# Patient Record
Sex: MALE | Race: WHITE | NOT HISPANIC OR LATINO | Employment: FULL TIME | URBAN - METROPOLITAN AREA
[De-identification: names, ages, dates, MRNs, and addresses within clinical notes are randomized per-mention and may not be internally consistent; named-entity substitution may affect disease eponyms.]

---

## 2017-04-20 ENCOUNTER — OFFICE VISIT (OUTPATIENT)
Dept: NEUROLOGY | Facility: MEDICAL CENTER | Age: 27
End: 2017-04-20
Payer: COMMERCIAL

## 2017-04-20 VITALS
SYSTOLIC BLOOD PRESSURE: 138 MMHG | DIASTOLIC BLOOD PRESSURE: 76 MMHG | TEMPERATURE: 98.1 F | BODY MASS INDEX: 27.36 KG/M2 | HEART RATE: 66 BPM | OXYGEN SATURATION: 100 % | HEIGHT: 72 IN | WEIGHT: 202 LBS

## 2017-04-20 DIAGNOSIS — R56.9 SEIZURE (HCC): ICD-10-CM

## 2017-04-20 PROCEDURE — 99204 OFFICE O/P NEW MOD 45 MIN: CPT | Performed by: PSYCHIATRY & NEUROLOGY

## 2017-04-20 NOTE — PROGRESS NOTES
NEUROLOGY INITIAL NOTE    Referring Physician  Marlena Mancia  Chief Complaint:  seizure    History of Present Illness:   For how long have you had seizures?  5 days ago    Frequency of seizures  One only    When was the last seizure? Patient was told that he/she cannot drive [place] the laws of the [place] for at 3 months after the last seizure.  15 APR 2017    Why are you having seizures?  No reason    DESCRIPTION  Behavior before the seizure  None    When do the seizures happen (time of the day, day of the month-menstrual period)?  Daytime    Any possible triggers (alcohol, flashing lights, etc)  He cannot think of any    What happens during the event? Eyes open or closed, sphincter incontinence, stiffness vs limpness, automatisms, etc,  LOC, he became stiff and had a convulsion.    Duration of the spell  Unknown    Part of the body involved  Whole body    What happens after the event?  Was back conversing 10-15 minutes after    What medications have you tried before?  None    What medications are you on now?  None    What side-effects have you had w the current anti-seizure regime?  N/a    Screening for depression was performed: not depressed      Medical History:  none    Family History:  HTN  Cancer  stroke    Social History:  Tobacco use: none. Alcohol: drinks on weekends. Work: engineering in buildings. Lives w a roommate    ROS:  Constitutional: Denies fevers, Denies weight changes   Eyes: Denies changes in vision, no eye pain   Ears/Nose/Throat/Mouth: Denies nasal congestion or sore throat   Cardiovascular: Denies chest pain or palpitations   Respiratory: Denies SOB.   Gastrointestinal/Hepatic: Denies abdominal pain, nausea, vomiting, diarrhea, constipation or GI bleeding   Genitourinary: Denies bladder dysfunction, dysuria or frequency   Musculoskeletal/Rheum: Denies joint pain and swelling   Skin/Breast: Denies rash, denies breast lumps or discharge   Neurological: Denies headache, confusion, memory  loss or focal weakness/parasthesias   Psychiatric: denies mood disorder.   Endocrine: denies hx of diabetes or thyroid dysfunction   Heme/Oncology/Lymph Nodes: Denies enlarged lymph nodes, denies brusing or known bleeding disorder   Allergic/Immunologic: Denies hx of allergies   Screening for depression was performed  All other systems were reviewed and are negative (AMA/CMS criteria)       Neurological Exam:  Orientation to time, place, and person were normal  Recent and remote memory were normal  Attention span and concentration were normal  Language (naming, repetition, spontaneous speech) was normal  Fund of knowledge was normal    All cranial nerves were normal: Pupils were equally round and reactive to light    Motor: (tone, bulk and strength): normal    Sensation (all modalities) was normal    Reflexes were normal and symmetrical in both upper and lower extremities    Coordination (finger-to-nose, heel/knee/shin, rapid alternating movements) was normal. There was no ataxia, no tremors, and no dysmetria. Station and gait were normal    Peripheral pulses were normal       NEUROIMAGING: CT of the head done on 15APR2017 was normal    EEG:     Medications:  No current outpatient prescriptions on file.     No current facility-administered medications for this visit.       Blood pressure 138/76, pulse 66, temperature 36.7 °C (98.1 °F), height 1.829 m (6'), weight 91.627 kg (202 lb), SpO2 100 %.            @CMP@    There are no active problems to display for this patient.        Diagnosis:   One single seizure    Plan/Recommendations:  Patient was told that the State [facility] prohibits people from driving up to 90 days after they have had an epileptic seizure.  He should be able to go back to driving in three months provided that he reamains seizure  Will get an EEG      Depression screening was  Done_____  Not Done_____        J. Michoacano Espinal MD  Professor of Neurology and Pediatrics  [facility]  School of Medicine  [facility]

## 2017-06-06 ENCOUNTER — NON-PROVIDER VISIT (OUTPATIENT)
Dept: NEUROLOGY | Facility: MEDICAL CENTER | Age: 27
End: 2017-06-06
Payer: COMMERCIAL

## 2017-06-06 DIAGNOSIS — R56.9 SEIZURE (HCC): ICD-10-CM

## 2017-06-06 PROCEDURE — 95819 EEG AWAKE AND ASLEEP: CPT | Performed by: PSYCHIATRY & NEUROLOGY

## 2017-06-06 NOTE — MR AVS SNAPSHOT
Tavares Knowles   2017 9:00 AM   Non-Provider Visit   MRN: [id number]    Department:  [facility]   Dept Phone:  [phone]    Description:  Male : 1990   Provider:  NEURODIAGNOSTIC LAB [facility]           Allergies as of 2017     No Known Allergies      You were diagnosed with     Seizure (CMS-[facility])   [[id number]]         Vital Signs     Smoking Status                   Never Smoker            Basic Information     Date Of Birth Sex Race Ethnicity Preferred Language    1990 Male White Non- English      Your appointments     2017  2:20 PM   Follow Up Visit with ESTEBAN Barrios   [facility] (--)    [address], [address]  [facility] [address] [phone]           You will be receiving a confirmation call a few days before your appointment from our automated call confirmation system.              Health Maintenance        Date Due Completion Dates    IMM HEP B VACCINE (1 of 3 - Primary Series) 1992 ---    IMM HEP A VACCINE (1 of 2 - Standard Series) 1993 ---    IMM HPV VACCINE (1 of 3 - Male 3 Dose Series) 2003 ---    IMM VARICELLA (CHICKENPOX) VACCINE (1 of 2 - 2 Dose Adolescent Series) 2005 ---    IMM DTaP/Tdap/Td Vaccine (1 - Tdap) 2011 ---            Current Immunizations     No immunizations on file.      Below and/or attached are the medications your provider expects you to take. Review all of your home medications and newly ordered medications with your provider and/or pharmacist. Follow medication instructions as directed by your provider and/or pharmacist. Please keep your medication list with you and share with your provider. Update the information when medications are discontinued, doses are changed, or new medications (including over-the-counter products) are added; and carry medication information at all times in the event of emergency situations     Allergies:  No Known Allergies          Medications  Valid as  of: June 06, 2017 -  9:46 AM    Generic Name Brand Name Tablet Size Instructions for use    .                 Medicines prescribed today were sent to:     [facility] [address] - [place], [place] - [address]    [address] [place] [address]    Phone: [phone] Fax: [phone]    Open 24 Hours?: No      Medication refill instructions:       If your prescription bottle indicates you have medication refills left, it is not necessary to call your provider’s office. Please contact your pharmacy and they will refill your medication.    If your prescription bottle indicates you do not have any refills left, you may request refills at any time through one of the following ways: The online [employer] system (except Urgent Care), by calling your provider’s office, or by asking your pharmacy to contact your provider’s office with a refill request. Medication refills are processed only during regular business hours and may not be available until the next business day. Your provider may request additional information or to have a follow-up visit with you prior to refilling your medication.   *Please Note: Medication refills are assigned a new Rx number when refilled electronically. Your pharmacy may indicate that no refills were authorized even though a new prescription for the same medication is available at the pharmacy. Please request the medicine by name with the pharmacy before contacting your provider for a refill.           CartMomo Access Code: [email or web address]  Expires: 7/6/2017  8:00 AM    CartMomo  A secure, online tool to manage your health information     [employer]’s CartMomo® is a secure, online tool that connects you to your personalized health information from the privacy of your home -- day or night - making it very easy for you to manage your healthcare. Once the activation process is completed, you can even access your medical information using the CartMomo janey, which is available for free in the Apple Janey  store or Google Play store.     [employer] provides the following levels of access (as shown below):   My Chart Features   [facility] Doctor [facility]  Specialists [facility]  Urgent  Care Non-[facility]  Primary Care  Doctor   Email your healthcare team securely and privately 24/7 X X X    Manage appointments: schedule your next appointment; view details of past/upcoming appointments X      Request prescription refills. X      View recent personal medical records, including lab and immunizations X X X X   View health record, including health history, allergies, medications X X X X   Read reports about your outpatient visits, procedures, consult and ER notes X X X X   See your discharge summary, which is a recap of your hospital and/or ER visit that includes your diagnosis, lab results, and care plan. X X       How to register for [employer]:  1. Go to  [email or web address].  2. Click on the Sign Up Now box, which takes you to the New Member Sign Up page. You will need to provide the following information:  a. Enter your [employer] Access Code exactly as it appears at the top of this page. (You will not need to use this code after you’ve completed the sign-up process. If you do not sign up before the expiration date, you must request a new code.)   b. Enter your date of birth.   c. Enter your home email address.   d. Click Submit, and follow the next screen’s instructions.  3. Create a [employer] ID. This will be your [employer] login ID and cannot be changed, so think of one that is secure and easy to remember.  4. Create a [employer] password. You can change your password at any time.  5. Enter your Password Reset Question and Answer. This can be used at a later time if you forget your password.   6. Enter your e-mail address. This allows you to receive e-mail notifications when new information is available in [employer].  7. Click Sign Up. You can now view your health information.    For assistance activating your "[employer]t  account, call [phone]

## 2017-06-10 NOTE — PROCEDURES
DATE OF SERVICE:  06/06/2017    ROUTINE ELECTROENCEPHALOGRAM REPORT        INDICATION:       Patient has single seizure in April 2017, loss of consciousness and convulsion   with history of head injury.    TECHNIQUE: 30 channel routine electroencephalogram (EEG) was performed in accordance with the international 10-20 system. The study was reviewed in bipolar and referential montages. The recording examined the patient during wakeful and drowsy state(s).     DESCRIPTION OF THE RECORD:    The EEG background consists of posterior dominant alpha rhythm 10-11 Hz.    There is one episode of small sharp waves over the frontal region followed by   delta burst and stage II sleep was obtained and hyperventilation induced built up,   delta burst, which is still within normal range. There are no focal delta slow    ACTIVATION PROCEDURES:      Hyperventilation and Photic Stimulation were done    ICTAL AND/OR INTERICTAL FINDINGS:    one episode of small sharp waves over the frontal region followed by delta burst  No regional slowing was seen during this routine study.  No clinical events or seizures were reported or recorded during the study.      EKG: sampling of the EKG recording demonstrated sinus rhythm.        INTERPRETATION:    ________________________________________________________________________      INTERPRETATION:  This EEG denotes of one episode of probable epileptiform sharp waves from the   frontal region with potential of epileptiform disorder.    ________________________________________________________________________         ____________________________________     MD PRO LICONA / LIBERTAD    DD:  06/10/2017 08:05:32  DT:  06/10/2017 08:47:06    D#:  [id number]  Job#:  [id number]

## 2017-07-17 ENCOUNTER — OFFICE VISIT (OUTPATIENT)
Dept: NEUROLOGY | Facility: MEDICAL CENTER | Age: 27
End: 2017-07-17
Payer: COMMERCIAL

## 2017-07-17 VITALS
HEIGHT: 72 IN | HEART RATE: 57 BPM | SYSTOLIC BLOOD PRESSURE: 138 MMHG | TEMPERATURE: 98.1 F | BODY MASS INDEX: 27.43 KG/M2 | RESPIRATION RATE: 16 BRPM | OXYGEN SATURATION: 97 % | DIASTOLIC BLOOD PRESSURE: 82 MMHG | WEIGHT: 202.5 LBS

## 2017-07-17 DIAGNOSIS — R56.9 SEIZURE (HCC): ICD-10-CM

## 2017-07-17 DIAGNOSIS — M54.6 ACUTE MIDLINE THORACIC BACK PAIN: ICD-10-CM

## 2017-07-17 PROCEDURE — 99213 OFFICE O/P EST LOW 20 MIN: CPT | Performed by: NURSE PRACTITIONER

## 2017-07-17 ASSESSMENT — ENCOUNTER SYMPTOMS
SORE THROAT: 0
ABDOMINAL PAIN: 0
DIARRHEA: 0
SEIZURES: 0
DIZZINESS: 0
NAUSEA: 0
MUSCULOSKELETAL NEGATIVE: 1
CONSTITUTIONAL NEGATIVE: 1
COUGH: 0
VOMITING: 0
NERVOUS/ANXIOUS: 0
DEPRESSION: 0
HEADACHES: 0
DOUBLE VISION: 0

## 2017-07-17 ASSESSMENT — PATIENT HEALTH QUESTIONNAIRE - PHQ9: CLINICAL INTERPRETATION OF PHQ2 SCORE: 0

## 2017-07-17 NOTE — MR AVS SNAPSHOT
Tavares Knowles   2017 2:20 PM   Office Visit   MRN: [id number]    Department:  [facility]   Dept Phone:  [phone]    Description:  Male : 1990   Provider:  ESTEBAN Barrios           Reason for Visit     Follow-Up EEG Follow up      Allergies as of 2017     No Known Allergies      You were diagnosed with     Acute midline thoracic back pain   [2158212]       Seizure (CMS-HCC)   [[id number]]         Vital Signs     Blood Pressure Pulse Temperature Respirations Height Weight    138/82 mmHg 57 36.7 °C (98.1 °F) 16 1.829 m (6') 91.853 kg (202 lb 8 oz)    Body Mass Index Oxygen Saturation Smoking Status             27.46 kg/m2 97% Never Smoker          Basic Information     Date Of Birth Sex Race Ethnicity Preferred Language    1990 Male White Non- English      Problem List              ICD-10-CM Priority Class Noted - Resolved    Acute midline thoracic back pain M54.6   2017 - Present      Health Maintenance        Date Due Completion Dates    IMM HEP B VACCINE (1 of 3 - Primary Series) 1992 ---    IMM HEP A VACCINE (1 of 2 - Standard Series) 1993 ---    IMM HPV VACCINE (1 of 3 - Male 3 Dose Series) 2003 ---    IMM VARICELLA (CHICKENPOX) VACCINE (1 of 2 - 2 Dose Adolescent Series) 2005 ---    IMM DTaP/Tdap/Td Vaccine (1 - Tdap) 2011 ---    IMM INFLUENZA (1) 2017 ---            Current Immunizations     No immunizations on file.      Below and/or attached are the medications your provider expects you to take. Review all of your home medications and newly ordered medications with your provider and/or pharmacist. Follow medication instructions as directed by your provider and/or pharmacist. Please keep your medication list with you and share with your provider. Update the information when medications are discontinued, doses are changed, or new medications (including over-the-counter products) are added; and carry medication information at all  times in the event of emergency situations     Allergies:  No Known Allergies          Medications  Valid as of: July 17, 2017 -  2:45 PM    Generic Name Brand Name Tablet Size Instructions for use    .                 Medicines prescribed today were sent to:     [facility] [address] - [place], [place] - [address]    [address] [place] [address]    Phone: [phone] Fax: [phone]    Open 24 Hours?: No      Medication refill instructions:       If your prescription bottle indicates you have medication refills left, it is not necessary to call your provider’s office. Please contact your pharmacy and they will refill your medication.    If your prescription bottle indicates you do not have any refills left, you may request refills at any time through one of the following ways: The online [employer] system (except Urgent Care), by calling your provider’s office, or by asking your pharmacy to contact your provider’s office with a refill request. Medication refills are processed only during regular business hours and may not be available until the next business day. Your provider may request additional information or to have a follow-up visit with you prior to refilling your medication.   *Please Note: Medication refills are assigned a new Rx number when refilled electronically. Your pharmacy may indicate that no refills were authorized even though a new prescription for the same medication is available at the pharmacy. Please request the medicine by name with the pharmacy before contacting your provider for a refill.        Your To Do List     Future Labs/Procedures Complete By Expires    MR-CERVICAL SPINE-WITH & W/O  As directed 7/17/2018      Referral     A referral request has been sent to our patient care coordination department. Please allow 3-5 business days for us to process this request and contact you either by phone or mail. If you do not hear from us by the 5th business day, please call us at [phone].           "[employer] Access Code: [email or web address]  Expires: 8/16/2017  2:45 PM    "[employer]  A secure, online tool to manage your health information     [employer]’s "[employer]® is a secure, online tool that connects you to your personalized health information from the privacy of your home -- day or night - making it very easy for you to manage your healthcare. Once the activation process is completed, you can even access your medical information using the "[employer] janey, which is available for free in the Apple Janey store or Google Play store.     "[employer] provides the following levels of access (as shown below):   My Chart Features   [facility] Primary Care Doctor [facility]  Specialists [facility]  Urgent  Care Non-[facility]  Primary Care  Doctor   Email your healthcare team securely and privately 24/7 X X X    Manage appointments: schedule your next appointment; view details of past/upcoming appointments X      Request prescription refills. X      View recent personal medical records, including lab and immunizations X X X X   View health record, including health history, allergies, medications X X X X   Read reports about your outpatient visits, procedures, consult and ER notes X X X X   See your discharge summary, which is a recap of your hospital and/or ER visit that includes your diagnosis, lab results, and care plan. X X       How to register for "[employer]:  1. Go to  [email or web address].  2. Click on the Sign Up Now box, which takes you to the New Member Sign Up page. You will need to provide the following information:  a. Enter your "[employer] Access Code exactly as it appears at the top of this page. (You will not need to use this code after you’ve completed the sign-up process. If you do not sign up before the expiration date, you must request a new code.)   b. Enter your date of birth.   c. Enter your home email address.   d. Click Submit, and follow the next screen’s instructions.  3. Create a "[employer] ID. This  will be your [employer] login ID and cannot be changed, so think of one that is secure and easy to remember.  4. Create a [employer] password. You can change your password at any time.  5. Enter your Password Reset Question and Answer. This can be used at a later time if you forget your password.   6. Enter your e-mail address. This allows you to receive e-mail notifications when new information is available in [employer].  7. Click Sign Up. You can now view your health information.    For assistance activating your [employer] account, call [phone]

## 2017-07-17 NOTE — PROGRESS NOTES
Subjective:      Tavraes Knowles is a 27 y.o. male who presents with Follow-Up for One time seizure.  Here with girlfriend today.        HPI Here for a three month follow-up.  Wishing to be cleared to drive today.    April 15, 2017 suspected seizure, 1pm.  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  Went to give a high-five to a friend then blacked out.  He did twist off the side of the truck and fell.  Has had continuous mid-thoracic bilateral pain agrevated with arm bar raises, turning, etc.    INTERPRETATION:  This EEG denotes of one episode of probable epileptiform sharp waves from the frontal region with potential of epileptiform disorder.    Neck to the base of his rib cage-- twisting and sneezing as well as large breaths.  The pain/discomfort has stayed consistent.  Was referred to a chiropractor with minimal benefit.    No current outpatient prescriptions on file.     No current facility-administered medications for this visit.       Review of Systems   Constitutional: Negative.    HENT: Negative for hearing loss, nosebleeds and sore throat.         No recent head injury.   Eyes: Negative for double vision.        No new loss of vision.   Respiratory: Negative for cough.         No recent lung infections.   Cardiovascular: Negative for chest pain.   Gastrointestinal: Negative for nausea, vomiting, abdominal pain and diarrhea.   Genitourinary: Negative.    Musculoskeletal: Negative.    Skin: Negative.    Neurological: Negative for dizziness, seizures and headaches.   Endo/Heme/Allergies:        No history of endocrine dysfunction.  No new problems.   Psychiatric/Behavioral: Negative for depression. The patient is not nervous/anxious.         No recent mood changes.          Objective:     /82 mmHg  Pulse 57  Temp(Src) 36.7 °C (98.1 °F)  Resp 16  Ht 1.829 m (6')  Wt 91.853 kg (202 lb 8 oz)  BMI 27.46 kg/m2  SpO2 97%     Physical Exam   Constitutional: He is oriented to person,  place, and time. He appears well-developed and well-nourished. No distress.   HENT:   Head: Normocephalic and atraumatic.   Nose: Nose normal.   Mouth/Throat: Oropharynx is clear and moist.   No new hearing loss.   Eyes: Pupils are equal, round, and reactive to light.   No double vision or loss of vision.   Neck: Normal range of motion.   Cardiovascular: Normal rate, regular rhythm and normal heart sounds.    No recent chest pain.   Pulmonary/Chest: Effort normal.   Abdominal: There is no tenderness.   Genitourinary:   No recent infections.   Musculoskeletal: Normal range of motion.   Lymphadenopathy:     He has no cervical adenopathy.   Neurological: He is alert and oriented to person, place, and time. He has normal strength and normal reflexes. He displays no tremor. No cranial nerve deficit. He displays no seizure activity. Gait normal.   No observable changes in neurologic status.  See initial new patient examination for details.     Skin: Skin is warm and dry.   Psychiatric: He has a normal mood and affect. His speech is normal. His mood appears not anxious. He does not exhibit a depressed mood.           Assessment/Plan:     One Time Seizure:  No other concern for seizures since one time seizure occuring April 15, 2017.  No concern for aura, childhood febrile seizure, etc.    Lengthy conversation regarding the slightly abnormal EEG.    DMV form completed.    If another event suggestive of a seizure occurs will then obtain a 24-48 hour AEEG.    Obtain Brain MRI with/without contrast to evaluate CNS etiology of seizure.    Obtain C-spine and T-spine MRI's to evaluate for sub-acute pain, probably muscle strain related to the event in April.    Refer to [facility] for evaluation and treatment.      EDUCATION AND COUNSELING:  -Education was provided to the patient and/or family regarding diagnosis and prognosis. The chronic and unpredictable nature of the condition was discussed. There is increased risk for  additional events, which may carry potential for significant injuries and death.    -We reviewed the current antiepileptic regimen. Potential side effects of antiepileptics were discussed at length, including but no limited to: hypersensitivity reactions (rash and others, some of which can be fatal), visual field changes (some of which may be irreversible), glaucoma, diplopia, kidney stones, osteopenia/osteoporosis/bone fractures, hyperthermia/anhydrosis, tremors, ataxia, dizziness, fatigue, increased risk for falls, cardiac arrhythmias/syncope, gastrointestinal (hepatitis, pancreatitis, gastritis, ulcers), gingival hypertrophy, drowsiness, sedation, anxiety/nervousness, increased risk for suicide, increased risk for depression, and psychosis. We reviewed drug-drug interactions and their potential effect on seizure control and medication side effects.    -Patient/family educated on SUDEP (Sudden Death in Epilepsy). Counseling was provided on the importance of strict medication and follow up compliance. The patient understands the risks associated with non-adherence with the medical plan as outlined, including but not limited to an increased risk for breakthrough seizures, which may contribute to injuries, disability, status epilepticus, and even death.    -Counseling was also provided on potential effects of alcohol and other drugs, which may lower seizure threshold and/or affect the metabolism of antiepileptic drugs. I recommend avoidance of alcohol and illegal drugs.  -Recommend proper hydration, regular exercise, proper sleep hygiene (7-8 hrs of overnight sleep, avoid sleep deprivation).    -I have made the patient aware of mandatory reporting required by the law in the State [facility] regarding episodes of seizures, loss of consciousness, and/or alteration of awareness. The patient and family are responsible for reporting events to the DMV, instructions were provided. The patient verbalized understanding and  states he has not been driving. Other seizure precautions were discussed at length, including no diving, no skydiving, no unsupervised swimming, no Jacuzzi or bathing in bathtubs.      Pt agrees with plan.

## 2017-08-09 ENCOUNTER — HOSPITAL ENCOUNTER (OUTPATIENT)
Dept: RADIOLOGY | Facility: MEDICAL CENTER | Age: 27
End: 2017-08-09
Attending: NURSE PRACTITIONER
Payer: COMMERCIAL

## 2017-08-09 DIAGNOSIS — R56.9 SEIZURE (HCC): ICD-10-CM

## 2017-08-09 DIAGNOSIS — M54.6 ACUTE MIDLINE THORACIC BACK PAIN: ICD-10-CM

## 2017-08-09 PROCEDURE — 72157 MRI CHEST SPINE W/O & W/DYE: CPT

## 2017-08-09 PROCEDURE — 72156 MRI NECK SPINE W/O & W/DYE: CPT

## 2017-08-09 PROCEDURE — A9579 GAD-BASE MR CONTRAST NOS,1ML: HCPCS | Performed by: NURSE PRACTITIONER

## 2017-08-09 PROCEDURE — 700117 HCHG RX CONTRAST REV CODE 255: Performed by: NURSE PRACTITIONER

## 2017-08-09 PROCEDURE — 70553 MRI BRAIN STEM W/O & W/DYE: CPT

## 2017-08-09 RX ADMIN — GADODIAMIDE 20 ML: 287 INJECTION INTRAVENOUS at 15:15

## 2017-08-10 ENCOUNTER — TELEPHONE (OUTPATIENT)
Dept: NEUROLOGY | Facility: MEDICAL CENTER | Age: 27
End: 2017-08-10

## 2017-08-10 NOTE — TELEPHONE ENCOUNTER
Please let Julian know that I have reviewed his MRI's.  The only abnormal is as follows:    1.  Chronic T6 vertebral body compression fracture with less than 25% loss of height anteriorly and no retropulsion.    I'd recommend he be seen by a spine surgeon.  Does he need a referral?

## 2017-08-17 ENCOUNTER — TELEPHONE (OUTPATIENT)
Dept: NEUROLOGY | Facility: MEDICAL CENTER | Age: 27
End: 2017-08-17

## 2017-08-17 NOTE — TELEPHONE ENCOUNTER
Left message on Patient's voice mail again to go over his MRI results. Asked for patient to please give us a phone call back and also to let us know if he does get a voicemail, whether or not it would be okay for us to leave him a detailed message regarding those results.

## 2018-05-14 ENCOUNTER — OFFICE VISIT (OUTPATIENT)
Dept: NEUROLOGY | Facility: MEDICAL CENTER | Age: 28
End: 2018-05-14
Payer: COMMERCIAL

## 2018-05-14 VITALS
DIASTOLIC BLOOD PRESSURE: 84 MMHG | HEART RATE: 63 BPM | SYSTOLIC BLOOD PRESSURE: 122 MMHG | TEMPERATURE: 97.3 F | BODY MASS INDEX: 27.83 KG/M2 | WEIGHT: 205.47 LBS | RESPIRATION RATE: 16 BRPM | OXYGEN SATURATION: 96 % | HEIGHT: 72 IN

## 2018-05-14 DIAGNOSIS — F51.01 PRIMARY INSOMNIA: ICD-10-CM

## 2018-05-14 PROCEDURE — 99213 OFFICE O/P EST LOW 20 MIN: CPT | Performed by: NURSE PRACTITIONER

## 2018-05-14 RX ORDER — TRAZODONE HYDROCHLORIDE 100 MG/1
50-100 TABLET ORAL PRN
Qty: 30 TAB | Refills: 11 | Status: SHIPPED | OUTPATIENT
Start: 2018-05-14 | End: 2019-01-10 | Stop reason: SDUPTHER

## 2018-05-14 ASSESSMENT — ENCOUNTER SYMPTOMS
SORE THROAT: 0
DEPRESSION: 0
VOMITING: 0
MUSCULOSKELETAL NEGATIVE: 1
DOUBLE VISION: 0
NAUSEA: 0
NERVOUS/ANXIOUS: 0
ABDOMINAL PAIN: 0
DIARRHEA: 0
COUGH: 0
CONSTITUTIONAL NEGATIVE: 1
HEADACHES: 0
SEIZURES: 0
INSOMNIA: 1

## 2018-05-14 NOTE — PROGRESS NOTES
Subjective:      Tavares Knowles is a 27 y.o. male who presents with Follow-Up (Seizure)            HPI   Still having a hard time falling asleep and staying asleep.  Used to take Ambien which caused him to sleep walking.    April 15, 2017 suspected seizure, 1pm.  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  Went to give a high-five to a friend then blacked out.  He did twist off the side of the truck and fell.      Needs DMV form today.    Working many hours per week.    INTERPRETATION:  This EEG denotes of one episode of probable epileptiform sharp waves from the frontal region with potential of epileptiform disorder.    2017 Brain MRI normal.    No current outpatient prescriptions on file.     No current facility-administered medications for this visit.        Review of Systems   Constitutional: Negative.    HENT: Negative for hearing loss, nosebleeds and sore throat.         No recent head injury.   Eyes: Negative for double vision.        No new loss of vision.   Respiratory: Negative for cough.         No recent lung infections.   Cardiovascular: Negative for chest pain.   Gastrointestinal: Negative for abdominal pain, diarrhea, nausea and vomiting.   Genitourinary: Negative.    Musculoskeletal: Negative.    Skin: Negative.    Neurological: Negative for seizures and headaches.   Endo/Heme/Allergies:        No history of endocrine dysfunction.  No new problems.   Psychiatric/Behavioral: Negative for depression. The patient has insomnia. The patient is not nervous/anxious.         No recent mood changes.          Objective:     /84   Pulse 63   Temp 36.3 °C (97.3 °F)   Resp 16   Ht 1.829 m (6')   Wt 93.2 kg (205 lb 7.5 oz)   SpO2 96%   BMI 27.87 kg/m²      Physical Exam   Constitutional: He is oriented to person, place, and time. He appears well-developed and well-nourished. No distress.   HENT:   Head: Normocephalic and atraumatic.   Nose: Nose normal.   No new hearing loss.    Eyes: EOM are normal.   No double vision or loss of vision.   Neck: Normal range of motion.   Cardiovascular: Normal rate, regular rhythm and normal heart sounds.  Exam reveals no gallop and no friction rub.    No murmur heard.  No recent chest pain.   Pulmonary/Chest: Breath sounds normal.   Abdominal: There is no tenderness.   Genitourinary:   Genitourinary Comments: No recent infections.   Musculoskeletal: Normal range of motion.   Lymphadenopathy:     He has no cervical adenopathy.   Neurological: He is alert and oriented to person, place, and time. He has normal strength and normal reflexes. He displays no tremor. No cranial nerve deficit. He displays no seizure activity. Gait normal.   No observable changes in neurologic status.  See initial new patient examination for details.     Skin: Skin is warm and dry.   Psychiatric: He has a normal mood and affect. His speech is normal. His mood appears not anxious. He does not exhibit a depressed mood.             Assessment/Plan:     One Time Seizure:  No other concern for seizures since one time seizure occuring April 15, 2017.  No concern for aura, childhood febrile seizure, etc.     Lengthy conversation regarding the slightly abnormal EEG.     DMV form completed once again.     If another event suggestive of a seizure occurs will then obtain a 24-48 hour AEEG.     Sleep disorder:  New Rx for trazodone prescribed.    Return for follow-up in one year or sooner if needed.        EDUCATION AND COUNSELING:  -Education was provided to the patient and/or family regarding diagnosis and prognosis. The chronic and unpredictable nature of the condition was discussed. There is increased risk for additional events, which may carry potential for significant injuries and death.    -We reviewed the current antiepileptic regimen. Potential side effects of antiepileptics were discussed at length, including but no limited to: hypersensitivity reactions (rash and others, some of  which can be fatal), visual field changes (some of which may be irreversible), glaucoma, diplopia, kidney stones, osteopenia/osteoporosis/bone fractures, hyperthermia/anhydrosis, tremors, ataxia, dizziness, fatigue, increased risk for falls, cardiac arrhythmias/syncope, gastrointestinal (hepatitis, pancreatitis, gastritis, ulcers), gingival hypertrophy, drowsiness, sedation, anxiety/nervousness, increased risk for suicide, increased risk for depression, and psychosis. We reviewed drug-drug interactions and their potential effect on seizure control and medication side effects.    -Patient/family educated on SUDEP (Sudden Death in Epilepsy). Counseling was provided on the importance of strict medication and follow up compliance. The patient understands the risks associated with non-adherence with the medical plan as outlined, including but not limited to an increased risk for breakthrough seizures, which may contribute to injuries, disability, status epilepticus, and even death.    -Counseling was also provided on potential effects of alcohol and other drugs, which may lower seizure threshold and/or affect the metabolism of antiepileptic drugs. I recommend avoidance of alcohol and illegal drugs.  -Recommend proper hydration, regular exercise, proper sleep hygiene (7-8 hrs of overnight sleep, avoid sleep deprivation).    -I have made the patient aware of mandatory reporting required by the law in the [place] regarding episodes of seizures, loss of consciousness, and/or alteration of awareness. The patient and family are responsible for reporting events to the DMV, instructions were provided. The patient verbalized understanding and states he has not been driving. Other seizure precautions were discussed at length, including no diving, no skydiving, no unsupervised swimming, no Jacuzzi or bathing in bathtubs.       Pt agrees with plan.

## 2018-05-14 NOTE — PROGRESS NOTES
Subjective:      Tavares Knowles is a 27 y.o. male who presents with Follow-Up (Seizure)            HPI    ROS       Objective:     /84   Pulse 63   Temp 36.3 °C (97.3 °F)   Resp 16   Ht 1.829 m (6')   Wt 93.2 kg (205 lb 7.5 oz)   SpO2 96%   BMI 27.87 kg/m²      Physical Exam            Assessment/Plan:     There are no diagnoses linked to this encounter.

## 2018-12-20 ENCOUNTER — TELEPHONE (OUTPATIENT)
Dept: SCHEDULING | Facility: IMAGING CENTER | Age: 28
End: 2018-12-20

## 2019-01-10 ENCOUNTER — APPOINTMENT (RX ONLY)
Dept: URBAN - METROPOLITAN AREA CLINIC 20 | Facility: CLINIC | Age: 29
Setting detail: DERMATOLOGY
End: 2019-01-10

## 2019-01-10 DIAGNOSIS — L20.89 OTHER ATOPIC DERMATITIS: ICD-10-CM | Status: WELL CONTROLLED

## 2019-01-10 PROBLEM — L20.84 INTRINSIC (ALLERGIC) ECZEMA: Status: ACTIVE | Noted: 2019-01-10

## 2019-01-10 PROCEDURE — 99213 OFFICE O/P EST LOW 20 MIN: CPT

## 2019-01-10 PROCEDURE — ? COUNSELING

## 2019-01-10 PROCEDURE — ? PRESCRIPTION

## 2019-01-10 RX ORDER — TRAZODONE HYDROCHLORIDE 100 MG/1
50-100 TABLET ORAL PRN
Qty: 30 TAB | Refills: 11 | Status: SHIPPED | OUTPATIENT
Start: 2019-01-10 | End: 2019-12-30

## 2019-01-10 RX ORDER — TRIAMCINOLONE ACETONIDE 1 MG/G
CREAM TOPICAL BID
Qty: 1 | Refills: 2 | Status: ERX | COMMUNITY
Start: 2019-01-10

## 2019-01-10 RX ADMIN — TRIAMCINOLONE ACETONIDE: 1 CREAM TOPICAL at 22:35

## 2019-01-10 ASSESSMENT — LOCATION DETAILED DESCRIPTION DERM
LOCATION DETAILED: RIGHT DISTAL POSTERIOR UPPER ARM
LOCATION DETAILED: LEFT DISTAL POSTERIOR UPPER ARM

## 2019-01-10 ASSESSMENT — LOCATION SIMPLE DESCRIPTION DERM
LOCATION SIMPLE: LEFT UPPER ARM
LOCATION SIMPLE: RIGHT UPPER ARM

## 2019-01-10 ASSESSMENT — LOCATION ZONE DERM: LOCATION ZONE: ARM

## 2019-01-10 NOTE — TELEPHONE ENCOUNTER
Patient is requesting a 90 day supply.    Was the patient seen in the last year in this department? Yes 5/14/2018    Does patient have an active prescription for medications requested? Yes    Received Request Via: Pharmacy    No follow up at this time.

## 2019-01-30 ENCOUNTER — RX ONLY (OUTPATIENT)
Age: 29
Setting detail: RX ONLY
End: 2019-01-30

## 2019-01-30 RX ORDER — TACROLIMUS 1 MG/G
OINTMENT TOPICAL
Qty: 1 | Refills: 3 | Status: ERX

## 2019-04-18 ENCOUNTER — OFFICE VISIT (OUTPATIENT)
Dept: INTERNAL MEDICINE | Facility: MEDICAL CENTER | Age: 29
End: 2019-04-18
Payer: COMMERCIAL

## 2019-04-18 VITALS
WEIGHT: 209 LBS | BODY MASS INDEX: 29.26 KG/M2 | HEIGHT: 71 IN | HEART RATE: 69 BPM | TEMPERATURE: 98 F | OXYGEN SATURATION: 96 % | DIASTOLIC BLOOD PRESSURE: 78 MMHG | SYSTOLIC BLOOD PRESSURE: 134 MMHG

## 2019-04-18 DIAGNOSIS — E66.3 OVERWEIGHT (BMI 25.0-29.9): ICD-10-CM

## 2019-04-18 DIAGNOSIS — G47.00 INSOMNIA, UNSPECIFIED TYPE: ICD-10-CM

## 2019-04-18 DIAGNOSIS — Z76.89 ESTABLISHING CARE WITH NEW DOCTOR, ENCOUNTER FOR: ICD-10-CM

## 2019-04-18 DIAGNOSIS — L30.9 DERMATITIS: ICD-10-CM

## 2019-04-18 DIAGNOSIS — R04.0 EPISTAXIS, RECURRENT: ICD-10-CM

## 2019-04-18 PROBLEM — G89.29 CHRONIC MIDLINE THORACIC BACK PAIN: Status: ACTIVE | Noted: 2017-07-17

## 2019-04-18 PROCEDURE — 99204 OFFICE O/P NEW MOD 45 MIN: CPT | Mod: GC | Performed by: INTERNAL MEDICINE

## 2019-04-18 RX ORDER — TACROLIMUS 1 MG/G
OINTMENT TOPICAL
COMMUNITY
Start: 2019-01-29 | End: 2020-07-20

## 2019-04-18 ASSESSMENT — PAIN SCALES - GENERAL: PAINLEVEL: NO PAIN

## 2019-04-18 ASSESSMENT — PATIENT HEALTH QUESTIONNAIRE - PHQ9: CLINICAL INTERPRETATION OF PHQ2 SCORE: 0

## 2019-04-18 NOTE — PROGRESS NOTES
New Patient to Establish    Reason to establish: New patient to establish    CC:   -Establish care with a new physician  -Request medical evaluation and management of recurrent epistaxis  -Request new lab test orders    HPI: Mr. Knowles is a 28-year-old male who presents to the clinic for the previously mentioned reasons.  Past medical history significant for chronic topical dermatitis, overweight, insomnia, history of epistaxis S/P chemical cauterization at age 18 years old.    recurrent epistaxis:  Patient reports a previous history of epistaxis during teenager which was treated by chemical cauterization.  Patient reports being free of epistaxis over the last 10 years but he noticed a recurrence of his obstruction 6 months ago which is getting progressively worse and now is almost every morning.  He reports continue bleeding/drooping in the morning which improved after compression and regular breakup epistaxis during the day.  He reports using Vaseline/moisturizing with no improvement.  Denies recent trauma, denies nose picking, family history/personal history of bleeding disorder, ecchymosis, being on blood thinner.  Last CBC on 2014 was within normal limits.      Patient Active Problem List    Diagnosis Date Noted   • Overweight (BMI 25.0-29.9) 04/18/2019   • Insomnia 04/18/2019   • Epistaxis, recurrent 04/18/2019   • Dermatitis 04/18/2019   • Chronic midline thoracic back pain 07/17/2017       Past Medical History:   Diagnosis Date   • Overweight (BMI 25.0-29.9)        Current Outpatient Prescriptions   Medication Sig Dispense Refill   • tacrolimus (PROTOPIC) 0.1 % Ointment      • traZODone (DESYREL) 100 MG Tab Take 0.5-1 Tabs by mouth as needed for Sleep. 30 Tab 11     No current facility-administered medications for this visit.        Allergies as of 04/18/2019   • (No Known Allergies)       Social History     Social History   • Marital status: Single     Spouse name: N/A   • Number of children: N/A   • Years  "of education: N/A     Occupational History   • Not on file.     Social History Main Topics   • Smoking status: Never Smoker   • Smokeless tobacco: Never Used   • Alcohol use No      Comment: OCC once per week   • Drug use: No   • Sexual activity: Yes     Partners: Female      Comment: pt partner on BC     Other Topics Concern   • Not on file     Social History Narrative   • No narrative on file       Family History   Problem Relation Age of Onset   • Hypertension Father    • Obesity Father        History reviewed. No pertinent surgical history.    ROS: As per HPI. Additional pertinent systems as noted below.  Constitutional: Negative for chills and fever.   HENT: Negative for ear pain and sore throat.    Eyes: Negative for discharge and redness.   Respiratory: Negative for cough, hemoptysis, wheezing and stridor.    Cardiovascular: Negative for chest pain, palpitations and leg swelling.   Gastrointestinal: Negative for abdominal pain, constipation, diarrhea, heartburn, nausea and vomiting.   Genitourinary: Negative for dysuria, flank pain and hematuria.   Musculoskeletal: Negative for falls and myalgias.   Skin: Negative for itching and rash.   Neurological: Negative for dizziness, seizures, loss of consciousness and headaches.   Endo/Heme/Allergies: Negative for polydipsia. Does not bruise/bleed easily.   Psychiatric/Behavioral: Negative for substance abuse and suicidal ideas.       /78 (BP Location: Right arm, Patient Position: Sitting, BP Cuff Size: Adult)   Pulse 69   Temp 36.7 °C (98 °F) (Temporal)   Ht 1.805 m (5' 11.06\")   Wt 94.8 kg (209 lb)   SpO2 96%   BMI 29.10 kg/m²     Physical Exam  General:  Alert and oriented, No apparent distress.    ENT: Positive for bilateral nasal mucosal hyperemia with crusting/thrombosed small blood vessels, negative for nasal discharge except for bleeding, negative for tonsillar hyperemia or exudate.    Eyes: Pupils equal and reactive. No scleral " icterus.    Throat: Clear no erythema or exudates noted.    Neck: Supple. No lymphadenopathy noted. Thyroid not enlarged.    Lungs: Clear to auscultation and percussion bilaterally.    Cardiovascular: Regular rate and rhythm. No murmurs, rubs or gallops.    Abdomen:  Benign. No rebound or guarding noted.    Extremities: No clubbing, cyanosis, edema.    Skin: Clear. No rash or suspicious skin lesions noted.    Other:     Note: I have reviewed all pertinent labs and diagnostic tests associated with this visit with specific comments listed under the assessment and plan below    Assessment and Plan    1. Epistaxis, recurrent  -Past medical history of epistaxis during teenage was treated with chemical cauterization and age 18 years old  -Reports epistaxis recurring over the last 6 months, gradually getting worse now it is daily mainly on the morning  -Patient did try Vaseline/moisturizing with no benefit  -Physical examination is consistent with congested nasal mucosa with dilated blood vessel on remanent of small blood clots  Plan  -Refer the patient to ENT special  -Educated patient about using Ponaris  -Educate the patient about epistaxis  -Educated patient about the potential side effect from chemical cauterization including mucosal necrosis and thinning of the mucosa  -We will follow-up in 3 months      2. Insomnia, unspecified type  -Patient reports history of insomnia (mainly difficulty in getting into sleep)  -Controlled well with trazodone 100 mg as needed  -His insomnia happened after a ground-level fall with T6 vertebral fracture in April 2017.  Plan  -Educate the patient about sleep hygiene  -Continue on trazodone 100 mg as needed for now    3. Overweight (BMI 25.0-29.9)  -Patient reports that he is trying to get more physically active by doing more aerobic physical exercise and diet modification  -Positive family history of hypertension and obesity in his father  -No personal or family history of coronary  artery disease  Plan  -Encourage the patient to do more aerobic physical exercise and diet modification  -Educated patient about the correlation between overweight/obesity and heart disease, hypertension, diabetes mellitus, dyslipidemia.      4. Dermatitis  -Patient reports a chronic history of topical dermatitis which currently controlled with topical tacrolimus which was prescribed by his dermatologist  -Patient currently following up with dermatologist in skin cancer and dermatology in [facility].  Plan  -Advised the patient to continue following up with dermatologist    5. Establishing care with new doctor, encounter for  -Patient currently has no primary care physician and he would like to establish care with us.    Followup: Return in about 3 months (around 7/18/2019).    Risk Assessment (discuss potential complications a function of chronic problems): Risk assessment has been discussed with the patient    Complexity (discuss number of co-morbidities): Comorbidities have been discussed with the patient    Signed by: Edwina Velez M.D.

## 2019-04-23 ENCOUNTER — OFFICE VISIT (OUTPATIENT)
Dept: NEUROLOGY | Facility: MEDICAL CENTER | Age: 29
End: 2019-04-23
Payer: COMMERCIAL

## 2019-04-23 VITALS
DIASTOLIC BLOOD PRESSURE: 80 MMHG | HEIGHT: 71 IN | HEART RATE: 85 BPM | OXYGEN SATURATION: 98 % | SYSTOLIC BLOOD PRESSURE: 132 MMHG | TEMPERATURE: 98.2 F | WEIGHT: 206 LBS | RESPIRATION RATE: 16 BRPM | BODY MASS INDEX: 28.84 KG/M2

## 2019-04-23 DIAGNOSIS — R56.9 SEIZURE (HCC): ICD-10-CM

## 2019-04-23 PROCEDURE — 99213 OFFICE O/P EST LOW 20 MIN: CPT | Performed by: NURSE PRACTITIONER

## 2019-04-23 ASSESSMENT — ENCOUNTER SYMPTOMS
NERVOUS/ANXIOUS: 0
SORE THROAT: 0
HEADACHES: 0
DIARRHEA: 0
MUSCULOSKELETAL NEGATIVE: 1
ABDOMINAL PAIN: 0
VOMITING: 0
DOUBLE VISION: 0
COUGH: 0
DEPRESSION: 0
NAUSEA: 0
INSOMNIA: 1
CONSTITUTIONAL NEGATIVE: 1
SEIZURES: 0

## 2019-04-23 NOTE — PROGRESS NOTES
"Subjective:      Tavares Knowles is a 28 y.o. male who presents with Follow-Up (Primary insomnia)            HPI He has been staying healthy. Still taking trazodone 100mg qhs during his work week.    April 15, 2017 suspected seizure, 1pm.  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  Went to give a high-five to a friend then blacked out.  He did twist off the side of the truck and fell.       Needs DMV form today.     Working many hours per week.     INTERPRETATION:  This EEG denotes of one episode of probable epileptiform sharp waves from the frontal region with potential of epileptiform disorder.      Current Outpatient Prescriptions   Medication Sig Dispense Refill   • tacrolimus (PROTOPIC) 0.1 % Ointment      • traZODone (DESYREL) 100 MG Tab Take 0.5-1 Tabs by mouth as needed for Sleep. 30 Tab 11     No current facility-administered medications for this visit.          Review of Systems   Constitutional: Negative.    HENT: Negative for hearing loss, nosebleeds and sore throat.         No recent head injury.   Eyes: Negative for double vision.        No new loss of vision.   Respiratory: Negative for cough.         No recent lung infections.   Cardiovascular: Negative for chest pain.   Gastrointestinal: Negative for abdominal pain, diarrhea, nausea and vomiting.   Genitourinary: Negative.    Musculoskeletal: Negative.    Skin: Negative.    Neurological: Negative for seizures and headaches.   Endo/Heme/Allergies:        No history of endocrine dysfunction.  No new problems.   Psychiatric/Behavioral: Negative for depression. The patient has insomnia. The patient is not nervous/anxious.         No recent mood changes.          Objective:     /80 (BP Location: Left arm, Patient Position: Sitting, BP Cuff Size: Adult)   Pulse 85   Temp 36.8 °C (98.2 °F) (Temporal)   Resp 16   Ht 1.805 m (5' 11.06\")   Wt 93.4 kg (206 lb)   SpO2 98%   BMI 28.68 kg/m²      Physical Exam   Constitutional: He " is oriented to person, place, and time. He appears well-developed and well-nourished. No distress.   HENT:   Head: Normocephalic and atraumatic.   Eyes: Pupils are equal, round, and reactive to light.   Neck: Normal range of motion.   Cardiovascular: Normal rate and regular rhythm.    Pulmonary/Chest: Effort normal and breath sounds normal. No respiratory distress.   Musculoskeletal: Normal range of motion.   Neurological: He is alert and oriented to person, place, and time. He has normal strength and normal reflexes. No cranial nerve deficit. He exhibits normal muscle tone. Coordination and gait normal.   No observable changes in neurologic status.  See initial new patient examination for details.     Skin: Skin is warm and dry.   Psychiatric: He has a normal mood and affect.               Assessment/Plan:     One Time Seizure:  No other concern for seizures since one time seizure occuring April 15, 2017.  No concern for aura, childhood febrile seizure, etc.     Lengthy conversation regarding the slightly abnormal EEG.     DMV form completed once again.     If another event suggestive of a seizure occurs will then obtain a 24-48 hour AEEG.     Sleep disorder:  Continue trazodone 50-100mg prn insomnia.     Return for follow-up in one year or sooner if needed.        EDUCATION AND COUNSELING:  -Education was provided to the patient and/or family regarding diagnosis and prognosis. The chronic and unpredictable nature of the condition was discussed. There is increased risk for additional events, which may carry potential for significant injuries and death.    -We reviewed the current antiepileptic regimen. Potential side effects of antiepileptics were discussed at length, including but no limited to: hypersensitivity reactions (rash and others, some of which can be fatal), visual field changes (some of which may be irreversible), glaucoma, diplopia, kidney stones, osteopenia/osteoporosis/bone fractures,  hyperthermia/anhydrosis, tremors, ataxia, dizziness, fatigue, increased risk for falls, cardiac arrhythmias/syncope, gastrointestinal (hepatitis, pancreatitis, gastritis, ulcers), gingival hypertrophy, drowsiness, sedation, anxiety/nervousness, increased risk for suicide, increased risk for depression, and psychosis. We reviewed drug-drug interactions and their potential effect on seizure control and medication side effects.    -Patient/family educated on SUDEP (Sudden Death in Epilepsy). Counseling was provided on the importance of strict medication and follow up compliance. The patient understands the risks associated with non-adherence with the medical plan as outlined, including but not limited to an increased risk for breakthrough seizures, which may contribute to injuries, disability, status epilepticus, and even death.    -Counseling was also provided on potential effects of alcohol and other drugs, which may lower seizure threshold and/or affect the metabolism of antiepileptic drugs. I recommend avoidance of alcohol and illegal drugs.  -Recommend proper hydration, regular exercise, proper sleep hygiene (7-8 hrs of overnight sleep, avoid sleep deprivation).    -I have made the patient aware of mandatory reporting required by the law in the State of [place] regarding episodes of seizures, loss of consciousness, and/or alteration of awareness. The patient and family are responsible for reporting events to the DMV, instructions were provided. The patient verbalized understanding and states he has not been driving. Other seizure precautions were discussed at length, including no diving, no skydiving, no unsupervised swimming, no Jacuzzi or bathing in bathtubs.       Pt agrees with plan.

## 2019-06-14 ENCOUNTER — HOSPITAL ENCOUNTER (OUTPATIENT)
Dept: LAB | Facility: MEDICAL CENTER | Age: 29
End: 2019-06-14
Attending: STUDENT IN AN ORGANIZED HEALTH CARE EDUCATION/TRAINING PROGRAM
Payer: COMMERCIAL

## 2019-06-14 DIAGNOSIS — R04.0 EPISTAXIS, RECURRENT: ICD-10-CM

## 2019-06-14 DIAGNOSIS — L30.9 DERMATITIS: ICD-10-CM

## 2019-06-14 LAB
BASOPHILS # BLD AUTO: 1.1 % (ref 0–1.8)
BASOPHILS # BLD: 0.05 K/UL (ref 0–0.12)
EOSINOPHIL # BLD AUTO: 0.1 K/UL (ref 0–0.51)
EOSINOPHIL NFR BLD: 2.2 % (ref 0–6.9)
ERYTHROCYTE [DISTWIDTH] IN BLOOD BY AUTOMATED COUNT: 42 FL (ref 35.9–50)
HCT VFR BLD AUTO: 45 % (ref 42–52)
HGB BLD-MCNC: 15.8 G/DL (ref 14–18)
IMM GRANULOCYTES # BLD AUTO: 0.01 K/UL (ref 0–0.11)
IMM GRANULOCYTES NFR BLD AUTO: 0.2 % (ref 0–0.9)
LYMPHOCYTES # BLD AUTO: 1.49 K/UL (ref 1–4.8)
LYMPHOCYTES NFR BLD: 32.7 % (ref 22–41)
MCH RBC QN AUTO: 31.6 PG (ref 27–33)
MCHC RBC AUTO-ENTMCNC: 35.1 G/DL (ref 33.7–35.3)
MCV RBC AUTO: 90 FL (ref 81.4–97.8)
MONOCYTES # BLD AUTO: 0.56 K/UL (ref 0–0.85)
MONOCYTES NFR BLD AUTO: 12.3 % (ref 0–13.4)
NEUTROPHILS # BLD AUTO: 2.35 K/UL (ref 1.82–7.42)
NEUTROPHILS NFR BLD: 51.5 % (ref 44–72)
NRBC # BLD AUTO: 0 K/UL
NRBC BLD-RTO: 0 /100 WBC
PLATELET # BLD AUTO: 234 K/UL (ref 164–446)
PMV BLD AUTO: 11.2 FL (ref 9–12.9)
RBC # BLD AUTO: 5 M/UL (ref 4.7–6.1)
WBC # BLD AUTO: 4.6 K/UL (ref 4.8–10.8)

## 2019-06-14 PROCEDURE — 36415 COLL VENOUS BLD VENIPUNCTURE: CPT

## 2019-06-14 PROCEDURE — 85025 COMPLETE CBC W/AUTO DIFF WBC: CPT

## 2019-06-14 PROCEDURE — 80053 COMPREHEN METABOLIC PANEL: CPT

## 2019-06-15 LAB
ALBUMIN SERPL BCP-MCNC: 4.6 G/DL (ref 3.2–4.9)
ALBUMIN/GLOB SERPL: 1.6 G/DL
ALP SERPL-CCNC: 74 U/L (ref 30–99)
ALT SERPL-CCNC: 42 U/L (ref 2–50)
ANION GAP SERPL CALC-SCNC: 10 MMOL/L (ref 0–11.9)
AST SERPL-CCNC: 72 U/L (ref 12–45)
BILIRUB SERPL-MCNC: 0.8 MG/DL (ref 0.1–1.5)
BUN SERPL-MCNC: 10 MG/DL (ref 8–22)
CALCIUM SERPL-MCNC: 9.6 MG/DL (ref 8.5–10.5)
CHLORIDE SERPL-SCNC: 103 MMOL/L (ref 96–112)
CO2 SERPL-SCNC: 23 MMOL/L (ref 20–33)
CREAT SERPL-MCNC: 0.99 MG/DL (ref 0.5–1.4)
GLOBULIN SER CALC-MCNC: 2.8 G/DL (ref 1.9–3.5)
GLUCOSE SERPL-MCNC: 72 MG/DL (ref 65–99)
POTASSIUM SERPL-SCNC: 3.7 MMOL/L (ref 3.6–5.5)
PROT SERPL-MCNC: 7.4 G/DL (ref 6–8.2)
SODIUM SERPL-SCNC: 136 MMOL/L (ref 135–145)

## 2019-12-13 ENCOUNTER — HOSPITAL ENCOUNTER (OUTPATIENT)
Dept: LAB | Facility: MEDICAL CENTER | Age: 29
End: 2019-12-13
Attending: STUDENT IN AN ORGANIZED HEALTH CARE EDUCATION/TRAINING PROGRAM
Payer: COMMERCIAL

## 2019-12-13 LAB
ANION GAP SERPL CALC-SCNC: 8 MMOL/L (ref 0–11.9)
BUN SERPL-MCNC: 15 MG/DL (ref 8–22)
CALCIUM SERPL-MCNC: 10.4 MG/DL (ref 8.5–10.5)
CHLORIDE SERPL-SCNC: 104 MMOL/L (ref 96–112)
CHOLEST SERPL-MCNC: 251 MG/DL (ref 100–199)
CO2 SERPL-SCNC: 28 MMOL/L (ref 20–33)
CREAT SERPL-MCNC: 1.18 MG/DL (ref 0.5–1.4)
GLUCOSE SERPL-MCNC: 96 MG/DL (ref 65–99)
HDLC SERPL-MCNC: 81 MG/DL
LDLC SERPL CALC-MCNC: 152 MG/DL
POTASSIUM SERPL-SCNC: 4 MMOL/L (ref 3.6–5.5)
SODIUM SERPL-SCNC: 140 MMOL/L (ref 135–145)
TRIGL SERPL-MCNC: 91 MG/DL (ref 0–149)

## 2019-12-13 PROCEDURE — 80048 BASIC METABOLIC PNL TOTAL CA: CPT

## 2019-12-13 PROCEDURE — 80061 LIPID PANEL: CPT

## 2019-12-13 PROCEDURE — 36415 COLL VENOUS BLD VENIPUNCTURE: CPT

## 2020-01-03 ENCOUNTER — TELEPHONE (OUTPATIENT)
Dept: NEUROLOGY | Facility: MEDICAL CENTER | Age: 30
End: 2020-01-03

## 2020-01-03 NOTE — TELEPHONE ENCOUNTER
Patient's pharmacy is stating that patient insurance is asking for a 90 day supply for Trazadone. You did sign or it but they want to know if you want to do #90 for 90 days or an adjust quantity since patient takes 1/2-1 prn for sleep.    Please advise     Call back [phone]  Ref #: [id number]

## 2020-01-07 RX ORDER — TRAZODONE HYDROCHLORIDE 100 MG/1
TABLET ORAL
Qty: 75 TAB | Refills: 0 | Status: SHIPPED | OUTPATIENT
Start: 2020-01-07 | End: 2020-06-23

## 2020-03-04 ENCOUNTER — OFFICE VISIT (OUTPATIENT)
Dept: URGENT CARE | Facility: CLINIC | Age: 30
End: 2020-03-04
Payer: COMMERCIAL

## 2020-03-04 VITALS
HEIGHT: 71 IN | RESPIRATION RATE: 16 BRPM | WEIGHT: 193.6 LBS | SYSTOLIC BLOOD PRESSURE: 128 MMHG | TEMPERATURE: 97.8 F | OXYGEN SATURATION: 98 % | DIASTOLIC BLOOD PRESSURE: 80 MMHG | BODY MASS INDEX: 27.1 KG/M2 | HEART RATE: 84 BPM

## 2020-03-04 DIAGNOSIS — R50.9 FEVER, UNSPECIFIED FEVER CAUSE: ICD-10-CM

## 2020-03-04 DIAGNOSIS — R68.89 FLU-LIKE SYMPTOMS: Primary | ICD-10-CM

## 2020-03-04 DIAGNOSIS — J06.9 URI WITH COUGH AND CONGESTION: ICD-10-CM

## 2020-03-04 PROBLEM — Z00.00 ENCOUNTER FOR GENERAL ADULT MEDICAL EXAMINATION WITHOUT ABNORMAL FINDINGS: Status: ACTIVE | Noted: 2019-12-12

## 2020-03-04 PROBLEM — R04.0 ANTERIOR EPISTAXIS: Status: ACTIVE | Noted: 2019-07-25

## 2020-03-04 PROBLEM — L20.9 ATOPIC DERMATITIS, UNSPECIFIED: Status: ACTIVE | Noted: 2019-07-25

## 2020-03-04 LAB
FLUAV+FLUBV AG SPEC QL IA: NEGATIVE
INT CON NEG: NORMAL
INT CON NEG: NORMAL
INT CON POS: NORMAL
INT CON POS: NORMAL
S PYO AG THROAT QL: NEGATIVE

## 2020-03-04 PROCEDURE — 87804 INFLUENZA ASSAY W/OPTIC: CPT | Performed by: PHYSICIAN ASSISTANT

## 2020-03-04 PROCEDURE — 99204 OFFICE O/P NEW MOD 45 MIN: CPT | Performed by: PHYSICIAN ASSISTANT

## 2020-03-04 PROCEDURE — 87880 STREP A ASSAY W/OPTIC: CPT | Performed by: PHYSICIAN ASSISTANT

## 2020-03-04 RX ORDER — OSELTAMIVIR PHOSPHATE 75 MG/1
75 CAPSULE ORAL 2 TIMES DAILY
Qty: 10 CAP | Refills: 0 | Status: SHIPPED | OUTPATIENT
Start: 2020-03-04 | End: 2020-03-09

## 2020-03-04 RX ORDER — METHYLPREDNISOLONE 4 MG/1
TABLET ORAL
Qty: 21 TAB | Refills: 0 | Status: SHIPPED | OUTPATIENT
Start: 2020-03-04 | End: 2020-07-20

## 2020-03-04 ASSESSMENT — FIBROSIS 4 INDEX: FIB4 SCORE: 1.28

## 2020-03-04 NOTE — PROGRESS NOTES
Subjective:      Pt is a 29 y.o. male who presents with Fever (fever, white spots on back of throat, cold sweats and fatigue)            HPI  This is a new problem. PT presents to  clinic today complaining of sore throat, fevers, chills, body aches, watery eyes, cold sweats, pressure in ears, cough, fatigue, runny nose. PT denies CP, SOB, NVD, abdominal pain, joint pain. PT states these symptoms began around 3 days ago. PT states the pain is a 7/10, aching in nature and worse at night.  Pt has not taken any RX medications for this condition. The pt's medication list, problem list, and allergies have been evaluated and reviewed during today's visit.      PMH:  Past Medical History:   Diagnosis Date   • Overweight (BMI 25.0-29.9)        PSH:  Negative per pt.      Fam Hx:    family history includes Hypertension in his father; Obesity in his father.  Family Status   Relation Name Status   • Mo nose bleed younger age. Alive   • Fa  Alive       Soc HX:  Social History     Socioeconomic History   • Marital status: Single     Spouse name: Not on file   • Number of children: Not on file   • Years of education: Not on file   • Highest education level: Not on file   Occupational History   • Not on file   Social Needs   • Financial resource strain: Not on file   • Food insecurity     Worry: Not on file     Inability: Not on file   • Transportation needs     Medical: Not on file     Non-medical: Not on file   Tobacco Use   • Smoking status: Never Smoker   • Smokeless tobacco: Never Used   Substance and Sexual Activity   • Alcohol use: No     Comment: OCC once per week   • Drug use: No   • Sexual activity: Yes     Partners: Female     Comment: pt partner on BC   Lifestyle   • Physical activity     Days per week: Not on file     Minutes per session: Not on file   • Stress: Not on file   Relationships   • Social connections     Talks on phone: Not on file     Gets together: Not on file     Attends Alevism service: Not on file  "    Active member of club or organization: Not on file     Attends meetings of clubs or organizations: Not on file     Relationship status: Not on file   • Intimate partner violence     Fear of current or ex partner: Not on file     Emotionally abused: Not on file     Physically abused: Not on file     Forced sexual activity: Not on file   Other Topics Concern   • Not on file   Social History Narrative   • Not on file         Medications:    Current Outpatient Medications:   •  traZODone (DESYREL) 100 MG Tab, Take 1/2-1 tablet PO qhs prn insomnia., Disp: 75 Tab, Rfl: 0  •  tacrolimus (PROTOPIC) 0.1 % Ointment, , Disp: , Rfl:       Allergies:  Patient has no known allergies.    ROS    Constitutional: Positive for chills, fevers, body aches and malaise/fatigue.   HENT: Positive for congestion and sore throat. Negative for ear pain.    Eyes: Negative for blurred vision, double vision and photophobia.   Respiratory: Positive for cough and sputum production. Negative for hemoptysis, shortness of breath and wheezing.    Cardiovascular: Negative for chest pain and palpitations.   Gastrointestinal: Negative for nausea, vomiting, abdominal pain, diarrhea and constipation.   Genitourinary: Negative for dysuria and flank pain.   Musculoskeletal: Negative for falls and myalgias.   Skin: Negative for itching and rash.   Neurological: Positive for headaches. Negative for dizziness and tingling.   Endo/Heme/Allergies: Does not bruise/bleed easily.   Psychiatric/Behavioral: Negative for depression. The patient is not nervous/anxious.           Objective:     /80 (BP Location: Left arm, Patient Position: Sitting, BP Cuff Size: Large adult)   Pulse 84   Temp 36.6 °C (97.8 °F) (Temporal)   Resp 16   Ht 1.803 m (5' 11\")   Wt 87.8 kg (193 lb 9.6 oz)   SpO2 98%   BMI 27.00 kg/m²      Physical Exam      Constitutional: PT is oriented to person, place, and time. PT appears well-developed and well-nourished. No distress. "   HENT:   Head: Normocephalic and atraumatic.   Right Ear: Hearing, tympanic membrane, external ear and ear canal normal.   Left Ear: Hearing, tympanic membrane, external ear and ear canal normal.   Nose: Mucosal edema, rhinorrhea and sinus tenderness present. Right sinus exhibits frontal sinus tenderness. Left sinus exhibits frontal sinus tenderness.   Mouth/Throat: Uvula is midline. Mucous membranes are pale. Posterior oropharyngeal edema and posterior oropharyngeal erythema with exudate noted on exam.   Eyes: Conjunctivae normal and EOM are normal. Pupils are equal, round, and reactive to light.   Neck: Normal range of motion. Neck supple. No thyromegaly present.   Cardiovascular: Normal rate, regular rhythm, normal heart sounds and intact distal pulses.  Exam reveals no gallop and no friction rub.    No murmur heard.  Pulmonary/Chest: Effort normal and breath sounds normal. No respiratory distress. PT has no wheezes. PT has no rales. PT exhibits no tenderness.   Abdominal: Soft. Bowel sounds are normal. PT exhibits no distension and no mass. There is no tenderness. There is no rebound and no guarding.   Musculoskeletal: Normal range of motion. PT exhibits no edema and no tenderness.   Lymphadenopathy:     PT has no cervical adenopathy.   Neurological: PT is alert and oriented to person, place, and time. PT displays normal reflexes. No cranial nerve deficit. PT exhibits normal muscle tone. Coordination normal.   Skin: Skin is warm and dry. No rash noted. No erythema.   Psychiatric: PT has a normal mood and affect. PT behavior is normal. Judgment and thought content normal.          Assessment/Plan:       1. URI with cough and congestion    - POCT Influenza A/B-->NEG  - POCT Rapid Strep A-->NEG    2. Fever, unspecified fever cause    - POCT Influenza A/B-->NEG  - POCT Rapid Strep A-->NEG    Tamiflu  Medrol  Concern for worsening symptoms of URI which shortly could transition to pneumonia and worsening sinus  congestion and infection with powerful cough keeping pt up at night as they must sleep upright to avoid coughing fits.  Diff DX: Bronchitis, Sinusitis, Pneumonia, Influenza, Viral URI, Allergies  Rest, fluids encouraged.  OTC decongestant for congestion/cough  AVS with medical info given.  Pt was in full understanding and agreement with the plan.  Differential diagnosis, natural history, supportive care, and indications for immediate follow-up discussed. All questions answered. Patient agrees with the plan of care.  Follow-up as needed if symptoms worsen or fail to improve to PCP, Urgent care or Emergency Room.

## 2020-07-15 ENCOUNTER — OFFICE VISIT (OUTPATIENT)
Dept: NEUROLOGY | Facility: MEDICAL CENTER | Age: 30
End: 2020-07-15
Payer: COMMERCIAL

## 2020-07-15 VITALS
OXYGEN SATURATION: 98 % | TEMPERATURE: 97.7 F | DIASTOLIC BLOOD PRESSURE: 74 MMHG | RESPIRATION RATE: 16 BRPM | SYSTOLIC BLOOD PRESSURE: 122 MMHG | HEIGHT: 71 IN | BODY MASS INDEX: 26.57 KG/M2 | HEART RATE: 60 BPM | WEIGHT: 189.82 LBS

## 2020-07-15 DIAGNOSIS — G43.C1 INTRACTABLE PERIODIC HEADACHE SYNDROME: ICD-10-CM

## 2020-07-15 DIAGNOSIS — S22.000S THORACIC COMPRESSION FRACTURE, SEQUELA: ICD-10-CM

## 2020-07-15 DIAGNOSIS — R56.9 SEIZURE (HCC): ICD-10-CM

## 2020-07-15 DIAGNOSIS — R20.2 ARM PARESTHESIA, LEFT: ICD-10-CM

## 2020-07-15 PROCEDURE — 99214 OFFICE O/P EST MOD 30 MIN: CPT | Performed by: NURSE PRACTITIONER

## 2020-07-15 RX ORDER — TRAZODONE HYDROCHLORIDE 100 MG/1
TABLET ORAL
Qty: 90 TAB | Refills: 1 | Status: SHIPPED | OUTPATIENT
Start: 2020-07-15 | End: 2020-09-15

## 2020-07-15 RX ORDER — ELETRIPTAN HYDROBROMIDE 40 MG/1
TABLET, FILM COATED ORAL
Qty: 9 TAB | Refills: 2 | Status: SHIPPED | OUTPATIENT
Start: 2020-07-15 | End: 2021-06-09 | Stop reason: SDUPTHER

## 2020-07-15 ASSESSMENT — FIBROSIS 4 INDEX: FIB4 SCORE: 1.33

## 2020-07-15 ASSESSMENT — PATIENT HEALTH QUESTIONNAIRE - PHQ9: CLINICAL INTERPRETATION OF PHQ2 SCORE: 0

## 2020-07-15 NOTE — PROGRESS NOTES
Subjective:      Tavares Knowles is a 30 y.o. male who presents with Follow-Up (Seizure )          HPI     Typical headache:  No aura.  Photophobia, pulsing pain in the frontal region.  Will be left with general malaise after the pain subsides.      Headaches began about 3-4 moths ago, early spring.    Occurring about one time per week.  Pain onset is in the evening hours usually.  More prominent during the work week and not on the weekend.    Abortive: tylenol, NSAIDs, cold wash cloth.  Has cut out most of caffeine from his diet.    Occupation: working from home as a .  He worked from home 50% prior to COVID-19 social restrictions.  Has a standing desk, etc to maintain correct ergonomics.    Very active and lifts for body weight training and runs 7-12 miles per week.    Water: plenty    Sleep: sometimes are worse than other.  Takes trazodone 5 times per week.  Will avoid taking it if he is drinking alcohol.  Trazodone helps him to fall asleep but does frequently wake-up.  To bed by 10pm and up at 5:45am with consistency.    Family history: none known for migraines.      April 15, 2017 suspected seizure, 1pm.  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  Went to give a high-five to a friend then blacked out.  He did twist off the side of the truck and fell.       Evaluated at [facility]: Treated with physical therapy and deep injections as well as acupuncture.  He suffers from pain in the mid thoracic region.    8/2017 IMPRESSION:  1.  Chronic T6 vertebral body compression fracture with less than 25% loss of height anteriorly and no retropulsion.  2.  No acute abnormality or abnormal enhancement.      6/2017 INTERPRETATION:  This EEG denotes of one episode of probable epileptiform sharp waves from the frontal region with potential of epileptiform disorder.      Current Outpatient Medications   Medication Sig Dispense Refill   • traZODone (DESYREL) 100 MG Tab TAKE 1/2 TO 1  "TABLET AS    NEEDED FOR SLEEP 90 Tab 0   • methylPREDNISolone (MEDROL DOSEPAK) 4 MG Tablet Therapy Pack Follow schedule on package instructions. 21 Tab 0   • tacrolimus (PROTOPIC) 0.1 % Ointment        No current facility-administered medications for this visit.          Review of Systems   Constitutional: Negative.    HENT: Negative for hearing loss, nosebleeds and sore throat.         No recent head injury.   Eyes: Negative for double vision.        No new loss of vision.   Respiratory: Negative for cough.         No recent lung infections.   Cardiovascular: Negative for chest pain.   Gastrointestinal: Negative for abdominal pain, diarrhea, nausea and vomiting.   Genitourinary: Negative.    Musculoskeletal: Positive for back pain.   Skin: Negative.    Neurological: Positive for headaches. Negative for seizures.   Endo/Heme/Allergies:        No history of endocrine dysfunction.  No new problems.   Psychiatric/Behavioral: Negative for depression. The patient has insomnia. The patient is not nervous/anxious.         No recent mood changes.          Objective:     /74 (BP Location: Left arm, Patient Position: Sitting, BP Cuff Size: Adult)   Pulse 60   Temp 36.5 °C (97.7 °F) (Temporal)   Resp 16   Ht 1.803 m (5' 10.98\")   Wt 86.1 kg (189 lb 13.1 oz)   SpO2 98%   BMI 26.49 kg/m²      Physical Exam  Constitutional:       General: He is not in acute distress.     Appearance: He is well-developed.   HENT:      Head: Normocephalic and atraumatic.      Nose: Nose normal.   Eyes:      Pupils: Pupils are equal, round, and reactive to light.      Comments: No double vision or loss of vision.   Neck:      Musculoskeletal: Normal range of motion and neck supple.   Cardiovascular:      Rate and Rhythm: Normal rate and regular rhythm.      Heart sounds: Normal heart sounds.      Comments: No recent chest pain.  Pulmonary:      Effort: Pulmonary effort is normal.      Breath sounds: Normal breath sounds.   Abdominal:    "   Palpations: Abdomen is soft.      Tenderness: There is no abdominal tenderness.   Genitourinary:     Comments: No recent infections.  Musculoskeletal:      Comments: Guarded mid to lower back.   Lymphadenopathy:      Cervical: No cervical adenopathy.   Skin:     General: Skin is warm and dry.   Neurological:      Mental Status: He is alert and oriented to person, place, and time.      Cranial Nerves: No cranial nerve deficit.      Motor: No tremor or seizure activity.      Coordination: Heel to Shin Test normal.      Gait: Gait normal.      Deep Tendon Reflexes: Reflexes are normal and symmetric.      Reflex Scores:       Tricep reflexes are 2+ on the right side and 2+ on the left side.       Bicep reflexes are 2+ on the right side and 2+ on the left side.       Brachioradialis reflexes are 2+ on the right side and 2+ on the left side.       Patellar reflexes are 2+ on the right side and 2+ on the left side.     Comments: No observable changes in neurologic status.  See initial new patient examination for details.     Psychiatric:         Mood and Affect: Mood is not anxious or depressed.         Speech: Speech normal.                 Assessment/Plan:     One Time Seizure:  No other concern for seizures since one time seizure occuring April 15, 2017.  No concern for aura, childhood febrile seizure, etc.     Lengthy conversation regarding the slightly abnormal EEG.    New concern, Headaches:  Typical headache:  No aura.  Photophobia, pulsing pain in the frontal region.  Will be left with general malaise after the pain subsides.      Headaches began about 3-4 months ago, early spring 2020.  Occurring about one time per week.  Pain onset is in the evening hours usually.  More prominent during the work week and not on the weekend    New Rx for Relpax tablet provided.  Consider combination therapy of NSAID and triptan.    Primary insomnia:  Continue trazodone 100mg tablets.    Obtain REEG to evaluate for subclinical  seizures.    Obtain MRI thoracic to evaluate etiology of pain.    Reviewed non-pharmacological methods of pain relief and tips to track the headaches.    Will follow-up after diagnostic evaluation is performed.     I spent 35+ minutes with this patient, over fifty percent was spent counseling patient on their condition, best management practices, reviewing test results and risks and benefits of treatment.

## 2020-07-20 ASSESSMENT — ENCOUNTER SYMPTOMS
INSOMNIA: 1
SEIZURES: 0
HEADACHES: 1
VOMITING: 0
NAUSEA: 0
COUGH: 0
BACK PAIN: 1
NERVOUS/ANXIOUS: 0
ABDOMINAL PAIN: 0
SORE THROAT: 0
DOUBLE VISION: 0
DEPRESSION: 0
CONSTITUTIONAL NEGATIVE: 1
DIARRHEA: 0

## 2020-09-01 ENCOUNTER — TELEPHONE (OUTPATIENT)
Dept: NEUROLOGY | Facility: MEDICAL CENTER | Age: 30
End: 2020-09-01

## 2020-09-01 ENCOUNTER — HOSPITAL ENCOUNTER (OUTPATIENT)
Dept: RADIOLOGY | Facility: MEDICAL CENTER | Age: 30
End: 2020-09-01
Attending: NURSE PRACTITIONER
Payer: COMMERCIAL

## 2020-09-01 DIAGNOSIS — S22.010A COMPRESSION FRACTURE OF T1 VERTEBRA, INITIAL ENCOUNTER (HCC): ICD-10-CM

## 2020-09-01 DIAGNOSIS — R20.2 ARM PARESTHESIA, LEFT: ICD-10-CM

## 2020-09-01 DIAGNOSIS — G43.C1 INTRACTABLE PERIODIC HEADACHE SYNDROME: ICD-10-CM

## 2020-09-01 DIAGNOSIS — S22.060A COMPRESSION FRACTURE OF T7 VERTEBRA, INITIAL ENCOUNTER (HCC): ICD-10-CM

## 2020-09-01 DIAGNOSIS — S22.000S THORACIC COMPRESSION FRACTURE, SEQUELA: ICD-10-CM

## 2020-09-01 PROCEDURE — A9576 INJ PROHANCE MULTIPACK: HCPCS | Performed by: NURSE PRACTITIONER

## 2020-09-01 PROCEDURE — 700117 HCHG RX CONTRAST REV CODE 255: Performed by: NURSE PRACTITIONER

## 2020-09-01 PROCEDURE — 72157 MRI CHEST SPINE W/O & W/DYE: CPT

## 2020-09-01 RX ADMIN — GADOTERIDOL 20 ML: 279.3 INJECTION, SOLUTION INTRAVENOUS at 12:37

## 2020-09-02 NOTE — TELEPHONE ENCOUNTER
Patient was notified he verbally understood , he is aware of th4e urgent referral submitted .   
Please let Julian know I have reviewed his back MRI.  The results are below.  I have placed an urgent referral to the Neurosurgeon to review his care.      MRI IMPRESSION:     1.  Acute/subacute mild T7 superior endplate compression fracture.  2.  Chronic mild T6 superior endplate compression fracture.  3.  No significant thoracic spinal or foraminal stenosis.  
no

## 2020-11-03 ENCOUNTER — NON-PROVIDER VISIT (OUTPATIENT)
Dept: NEUROLOGY | Facility: MEDICAL CENTER | Age: 30
End: 2020-11-03
Payer: COMMERCIAL

## 2020-11-03 DIAGNOSIS — G43.C1 INTRACTABLE PERIODIC HEADACHE SYNDROME: ICD-10-CM

## 2020-11-03 DIAGNOSIS — R56.9 SEIZURE (HCC): ICD-10-CM

## 2020-11-03 PROCEDURE — 95816 EEG AWAKE AND DROWSY: CPT | Performed by: PSYCHIATRY & NEUROLOGY

## 2020-11-03 PROCEDURE — 99999 PR NO CHARGE: CPT | Performed by: PSYCHIATRY & NEUROLOGY

## 2020-11-03 NOTE — PROCEDURES
ROUTINE ELECTROENCEPHALOGRAM REPORT      Referring provider: CARLOS MANUEL Oates.    DOS: 11/3/2020 (total recording of 27 minutes)    INDICATION:  Tavares Konwles 30 y.o. male presenting with history of seizure.    CURRENT ANTIEPILEPTIC REGIMEN: None.    TECHNIQUE: 30 channel routine electroencephalogram (EEG) was performed in accordance with the international 10-20 system. The study was reviewed in bipolar and referential montages. The recording examined the patient during wakeful and drowsy state(s).     DESCRIPTION OF THE RECORD:  During the wakefulness, the background showed a symmetrical 10 hz alpha activity posteriorly with amplitude of 70 mV.  There was reactivity to eye closure/opening.  A normal anterior-posterior gradient was noted with faster beta frequencies seen anteriorly.  During drowsiness, theta/delta frequencies were seen.    ACTIVATION PROCEDURES:   Intermittent Photic stimulation was performed in a stepwise fashion from 1 to 30 Hz and elicited a normal response (photic driving), most noticeable in the posterior leads.      ICTAL AND/OR INTERICTAL FINDINGS:   No focal or generalized epileptiform activity noted. No regional slowing was seen during this routine study.  No clinical events or seizures were reported or recorded during the study.     EKG: sampling of the EKG recording demonstrated sinus rhythm.       INTERPRETATION:  This is a normal routine EEG recording in the awake and drowsy state(s).  Clinical correlation is recommended.    Note: A normal EEG does not rule out epilepsy.  If the clinical suspicion remains high for seizures, a prolonged recording to capture clinical or subclinical events may be helpful.        Steve Joyce MD   Epilepsy and Neurodiagnostics.   Clinical  of Neurology [facility].   Diplomate in Neurology, Epilepsy, and Electrodiagnostic Medicine.   Office: [phone]  Fax: [phone]

## 2020-12-09 ENCOUNTER — HOSPITAL ENCOUNTER (OUTPATIENT)
Dept: LAB | Facility: MEDICAL CENTER | Age: 30
End: 2020-12-09
Attending: STUDENT IN AN ORGANIZED HEALTH CARE EDUCATION/TRAINING PROGRAM
Payer: COMMERCIAL

## 2020-12-09 LAB
ANION GAP SERPL CALC-SCNC: 8 MMOL/L (ref 7–16)
BUN SERPL-MCNC: 12 MG/DL (ref 8–22)
CALCIUM SERPL-MCNC: 9.8 MG/DL (ref 8.5–10.5)
CHLORIDE SERPL-SCNC: 100 MMOL/L (ref 96–112)
CHOLEST SERPL-MCNC: 255 MG/DL (ref 100–199)
CO2 SERPL-SCNC: 28 MMOL/L (ref 20–33)
CREAT SERPL-MCNC: 0.88 MG/DL (ref 0.5–1.4)
GLUCOSE SERPL-MCNC: 82 MG/DL (ref 65–99)
HDLC SERPL-MCNC: 80 MG/DL
LDLC SERPL CALC-MCNC: 156 MG/DL
POTASSIUM SERPL-SCNC: 4.1 MMOL/L (ref 3.6–5.5)
SODIUM SERPL-SCNC: 136 MMOL/L (ref 135–145)
TRIGL SERPL-MCNC: 96 MG/DL (ref 0–149)

## 2020-12-09 PROCEDURE — 80048 BASIC METABOLIC PNL TOTAL CA: CPT

## 2020-12-09 PROCEDURE — 80061 LIPID PANEL: CPT

## 2020-12-09 PROCEDURE — 36415 COLL VENOUS BLD VENIPUNCTURE: CPT

## 2021-03-17 ENCOUNTER — APPOINTMENT (OUTPATIENT)
Dept: RADIOLOGY | Facility: MEDICAL CENTER | Age: 31
End: 2021-03-17
Attending: EMERGENCY MEDICINE
Payer: COMMERCIAL

## 2021-03-17 ENCOUNTER — HOSPITAL ENCOUNTER (EMERGENCY)
Facility: MEDICAL CENTER | Age: 31
End: 2021-03-18
Attending: EMERGENCY MEDICINE
Payer: COMMERCIAL

## 2021-03-17 VITALS
TEMPERATURE: 98.5 F | OXYGEN SATURATION: 95 % | HEIGHT: 72 IN | DIASTOLIC BLOOD PRESSURE: 79 MMHG | SYSTOLIC BLOOD PRESSURE: 151 MMHG | WEIGHT: 193.34 LBS | RESPIRATION RATE: 16 BRPM | HEART RATE: 75 BPM | BODY MASS INDEX: 26.19 KG/M2

## 2021-03-17 DIAGNOSIS — S03.00XA DISLOCATION OF TEMPOROMANDIBULAR JOINT, INITIAL ENCOUNTER: ICD-10-CM

## 2021-03-17 LAB
ALBUMIN SERPL BCP-MCNC: 5 G/DL (ref 3.2–4.9)
ALBUMIN/GLOB SERPL: 1.8 G/DL
ALP SERPL-CCNC: 90 U/L (ref 30–99)
ALT SERPL-CCNC: 66 U/L (ref 2–50)
ANION GAP SERPL CALC-SCNC: 13 MMOL/L (ref 7–16)
AST SERPL-CCNC: 46 U/L (ref 12–45)
BASOPHILS # BLD AUTO: 0.4 % (ref 0–1.8)
BASOPHILS # BLD: 0.05 K/UL (ref 0–0.12)
BILIRUB SERPL-MCNC: 0.4 MG/DL (ref 0.1–1.5)
BUN SERPL-MCNC: 11 MG/DL (ref 8–22)
CALCIUM SERPL-MCNC: 10 MG/DL (ref 8.5–10.5)
CHLORIDE SERPL-SCNC: 99 MMOL/L (ref 96–112)
CO2 SERPL-SCNC: 24 MMOL/L (ref 20–33)
CREAT SERPL-MCNC: 0.85 MG/DL (ref 0.5–1.4)
EOSINOPHIL # BLD AUTO: 0.02 K/UL (ref 0–0.51)
EOSINOPHIL NFR BLD: 0.1 % (ref 0–6.9)
ERYTHROCYTE [DISTWIDTH] IN BLOOD BY AUTOMATED COUNT: 40.6 FL (ref 35.9–50)
GLOBULIN SER CALC-MCNC: 2.8 G/DL (ref 1.9–3.5)
GLUCOSE SERPL-MCNC: 119 MG/DL (ref 65–99)
HCT VFR BLD AUTO: 48.8 % (ref 42–52)
HGB BLD-MCNC: 17.2 G/DL (ref 14–18)
IMM GRANULOCYTES # BLD AUTO: 0.06 K/UL (ref 0–0.11)
IMM GRANULOCYTES NFR BLD AUTO: 0.4 % (ref 0–0.9)
LYMPHOCYTES # BLD AUTO: 0.62 K/UL (ref 1–4.8)
LYMPHOCYTES NFR BLD: 4.4 % (ref 22–41)
MAGNESIUM SERPL-MCNC: 2.2 MG/DL (ref 1.5–2.5)
MCH RBC QN AUTO: 31.8 PG (ref 27–33)
MCHC RBC AUTO-ENTMCNC: 35.2 G/DL (ref 33.7–35.3)
MCV RBC AUTO: 90.2 FL (ref 81.4–97.8)
MONOCYTES # BLD AUTO: 1.06 K/UL (ref 0–0.85)
MONOCYTES NFR BLD AUTO: 7.5 % (ref 0–13.4)
NEUTROPHILS # BLD AUTO: 12.36 K/UL (ref 1.82–7.42)
NEUTROPHILS NFR BLD: 87.2 % (ref 44–72)
NRBC # BLD AUTO: 0 K/UL
NRBC BLD-RTO: 0 /100 WBC
PLATELET # BLD AUTO: 283 K/UL (ref 164–446)
PMV BLD AUTO: 10.7 FL (ref 9–12.9)
POTASSIUM SERPL-SCNC: 3.5 MMOL/L (ref 3.6–5.5)
PROT SERPL-MCNC: 7.8 G/DL (ref 6–8.2)
RBC # BLD AUTO: 5.41 M/UL (ref 4.7–6.1)
SODIUM SERPL-SCNC: 136 MMOL/L (ref 135–145)
WBC # BLD AUTO: 14.2 K/UL (ref 4.8–10.8)

## 2021-03-17 PROCEDURE — 96374 THER/PROPH/DIAG INJ IV PUSH: CPT | Mod: EDC

## 2021-03-17 PROCEDURE — 70330 X-RAY EXAM OF JAW JOINTS: CPT

## 2021-03-17 PROCEDURE — 80053 COMPREHEN METABOLIC PANEL: CPT

## 2021-03-17 PROCEDURE — 21480 CLTX TMPRMAND DISLC 1ST/SBSQ: CPT | Mod: EDC

## 2021-03-17 PROCEDURE — 99285 EMERGENCY DEPT VISIT HI MDM: CPT | Mod: EDC

## 2021-03-17 PROCEDURE — 83735 ASSAY OF MAGNESIUM: CPT

## 2021-03-17 PROCEDURE — 36415 COLL VENOUS BLD VENIPUNCTURE: CPT | Mod: EDC

## 2021-03-17 PROCEDURE — 700111 HCHG RX REV CODE 636 W/ 250 OVERRIDE (IP): Performed by: EMERGENCY MEDICINE

## 2021-03-17 PROCEDURE — 70486 CT MAXILLOFACIAL W/O DYE: CPT

## 2021-03-17 PROCEDURE — 85025 COMPLETE CBC W/AUTO DIFF WBC: CPT

## 2021-03-17 PROCEDURE — 96375 TX/PRO/DX INJ NEW DRUG ADDON: CPT | Mod: EDC

## 2021-03-17 RX ORDER — MORPHINE SULFATE 4 MG/ML
4 INJECTION, SOLUTION INTRAMUSCULAR; INTRAVENOUS ONCE
Status: COMPLETED | OUTPATIENT
Start: 2021-03-17 | End: 2021-03-17

## 2021-03-17 RX ORDER — PROPOFOL 10 MG/ML
50 INJECTION, EMULSION INTRAVENOUS ONCE
Status: COMPLETED | OUTPATIENT
Start: 2021-03-18 | End: 2021-03-17

## 2021-03-17 RX ORDER — DIAZEPAM 5 MG/ML
2.5 INJECTION, SOLUTION INTRAMUSCULAR; INTRAVENOUS ONCE
Status: COMPLETED | OUTPATIENT
Start: 2021-03-17 | End: 2021-03-17

## 2021-03-17 RX ORDER — ONDANSETRON 2 MG/ML
4 INJECTION INTRAMUSCULAR; INTRAVENOUS ONCE
Status: COMPLETED | OUTPATIENT
Start: 2021-03-17 | End: 2021-03-17

## 2021-03-17 RX ADMIN — ONDANSETRON 4 MG: 2 INJECTION INTRAMUSCULAR; INTRAVENOUS at 22:37

## 2021-03-17 RX ADMIN — PROPOFOL 50 MG: 10 INJECTION, EMULSION INTRAVENOUS at 23:00

## 2021-03-17 RX ADMIN — PROPOFOL 50 MG: 10 INJECTION, EMULSION INTRAVENOUS at 22:57

## 2021-03-17 RX ADMIN — MORPHINE SULFATE 4 MG: 4 INJECTION INTRAVENOUS at 22:36

## 2021-03-17 RX ADMIN — DIAZEPAM 2.5 MG: 5 INJECTION, SOLUTION INTRAMUSCULAR; INTRAVENOUS at 22:16

## 2021-03-17 ASSESSMENT — FIBROSIS 4 INDEX: FIB4 SCORE: 1.33

## 2021-03-18 NOTE — ED NOTES
Patient back from CT in stable condition.  Will place on vital sigh monitors with alarms audible.  Will continue to assess.

## 2021-03-18 NOTE — ED TRIAGE NOTES
Chief Complaint   Patient presents with   • Jaw Pain     pt has had lock jaw for 45 minutes. pt has no hx of this event         Pt walk in tonight for above complaint. Pt not able to close his jaw, pt states very painful. Educated pt on triage process and to notify if there is any change      BP (!) 162/98   Pulse 68   Temp 36.2 °C (97.1 °F) (Temporal)   Resp 19   Ht 1.829 m (6')   Wt 87.7 kg (193 lb 5.5 oz)   SpO2 99%   BMI 26.22 kg/m²

## 2021-03-18 NOTE — ED NOTES
2250 -   Patient on all monitors with the alarms audible.  Oxygen, suction and resuscitation cart are ready and available.    Will continue to assess.

## 2021-03-18 NOTE — ED NOTES
Tavares Knowles D/C'd.  Discharge instructions including the importance of hydration, the use of OTC medications, information on TMJ dislocation and the proper follow up recommendations have been provided to the pt.  Pt states understanding.  Pt states all questions have been answered.  A copy of the discharge instructions have been provided to pt.  A signed copy is in the chart.     Pt ambulated out of department with fiance; pt in NAD, awake, alert, interactive and age appropriate.  Patient is aware of the need to return to the ER for any concerns or changes in condition. /79   Pulse 75   Temp 36.9 °C (98.5 °F) (Temporal)   Resp 16   Ht 1.829 m (6')   Wt 87.7 kg (193 lb 5.5 oz)   SpO2 95%   BMI 26.22 kg/m²

## 2021-03-18 NOTE — ED PROVIDER NOTES
ED Provider Note    CHIEF COMPLAINT  Chief Complaint   Patient presents with   • Jaw Pain     pt has had lock jaw for 45 minutes. pt has no hx of this event        HPI    Primary care provider: Pcp Pt States None   History obtained from: Patient  History limited by: None     Tavares Knowles is a 30 y.o. male who presents to the ED complaining of inability to close his jaw for 45 minutes.  He denies injury or trauma.  No prior history of similar.  He reports that his last tetanus shot was around 2000.  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  He denies recent illness/fever/chills/congestion/sore throat/cough/shortness of breath or difficulty breathing/nausea/vomiting/diarrhea/dysuria/rash.  He reports slight difficulty with swallowing due to his lockjaw currently.  He denies any other symptoms.  No significant past medical problems.    REVIEW OF SYSTEMS  Please see HPI for pertinent positives/negatives.  All other systems reviewed and are negative.     PAST MEDICAL HISTORY  Past Medical History:   Diagnosis Date   • Overweight (BMI 25.0-29.9)         SURGICAL HISTORY  History reviewed. No pertinent surgical history.     SOCIAL HISTORY  Social History     Tobacco Use   • Smoking status: Never Smoker   • Smokeless tobacco: Never Used   Substance and Sexual Activity   • Alcohol use: No     Comment: OCC once per week   • Drug use: No   • Sexual activity: Yes     Partners: Female     Comment: pt partner on BC        FAMILY HISTORY  Family History   Problem Relation Age of Onset   • Hypertension Father    • Obesity Father         CURRENT MEDICATIONS  Home Medications     Reviewed by Migdalia Gallegos R.N. (Registered Nurse) on 03/17/21 at 2015  Med List Status: <None>   Medication Last Dose Status   eletriptan (RELPAX) 40 MG tablet  Active   traZODone (DESYREL) 100 MG Tab  Active                 ALLERGIES  No Known Allergies     PHYSICAL EXAM  VITAL SIGNS: /79   Pulse 75    Temp 36.9 °C (98.5 °F) (Temporal)   Resp 16   Ht 1.829 m (6')   Wt 87.7 kg (193 lb 5.5 oz)   SpO2 95%   BMI 26.22 kg/m²  @AGAPITO[[id number]::@     Pulse ox interpretation: 99% I interpret this pulse ox as normal     Constitutional: Well developed, well nourished, alert in no apparent distress, nontoxic appearance    HENT: No external signs of trauma, normocephalic, oropharynx moist and clear, airway is widely patent, patient with open jaw and unable to close, tenderness over bilateral TMJ, no warmth/crepitus/fluctuance/rash, nose normal    Eyes: PERRL, conjunctiva without erythema, no discharge, no icterus    Neck: Soft and supple, trachea midline, no stridor, no tenderness/fullness, no LAD, no JVD, good ROM    Cardiovascular: Regular rate and rhythm, no murmurs/rubs/gallops, strong distal pulses and good perfusion    Thorax & Lungs: No respiratory distress, CTAB   Abdomen: Soft, nontender, nondistended, no guarding, no rebound, normal BS    Extremities: No cyanosis, no edema, no gross deformity, good ROM, no tenderness, intact distal pulses with brisk cap refill    Skin: Warm, dry, no pallor/cyanosis, no rash noted    Lymphatic: No lymphadenopathy noted    Neuro: A/O times 3, no focal deficits noted    Psychiatric: Cooperative      DIAGNOSTIC STUDIES / PROCEDURES    Conscious Sedation Procedure    Indication: Bilateral TMJ dislocation reduction    Consent: I have discussed with the patient and/or the patient representative the indication, alternatives, and the possible risks and/or complications of the planned procedure and the anesthesia methods. The patient and/or patient representative appear to understand and agree to proceed.    Physician Involvement: The attending physician was present and supervising this procedure.    Pre-Sedation Documentation and Exam: I have personally completed a history, physical exam & review of systems for this patient (see notes).  Airway Assessment: normal    Prior History of  Anesthesia Complications: none    ASA Classification: Class 1 - A normal healthy patient    Sedation/ Anesthesia Plan: intravenous sedation    Medications Used: propofol 100 mg intravenously    Monitoring and Safety: The patient was placed on a cardiac monitor and vital signs, pulse oximetry and level of consciousness were continuously evaluated throughout the procedure. The patient was closely monitored until recovery from the medications was complete and the patient had returned to baseline status. Respiratory therapy was on standby at all times during the procedure.    Post-Sedation Vital Signs: Vital signs were reviewed and were stable after the procedure (see flow sheet for vitals)            Post-Sedation Exam: Return to baseline           Complications: none        REDUCTION PROCEDURE NOTE:    Indication: Bilateral TMJ dislocation    Consent: The patient was counseled regarding the procedure, it's indications, risks, potential complications and alternatives and any questions were answered. Consent was obtained.    Procedure: The patient was placed in the appropriate position. Anesthesia/pain control was obtained using conscious sedation -SEE CONSCIOUS SEDATION NOTE FOR DETAILS. Reduction of the bilateral TMJ dislocation was performed by direct traction. Post reduction films were not obtained. A post-reduction exam revealed normal function.    The patient tolerated the procedure well.    Complications: None      LABS  All labs reviewed by me.     Results for orders placed or performed during the hospital encounter of 03/17/21   CBC WITH DIFFERENTIAL   Result Value Ref Range    WBC 14.2 (H) 4.8 - 10.8 K/uL    RBC 5.41 4.70 - 6.10 M/uL    Hemoglobin 17.2 14.0 - 18.0 g/dL    Hematocrit 48.8 42.0 - 52.0 %    MCV 90.2 81.4 - 97.8 fL    MCH 31.8 27.0 - 33.0 pg    MCHC 35.2 33.7 - 35.3 g/dL    RDW 40.6 35.9 - 50.0 fL    Platelet Count 283 164 - 446 K/uL    MPV 10.7 9.0 - 12.9 fL    Neutrophils-Polys 87.20 (H) 44.00 -  72.00 %    Lymphocytes 4.40 (L) 22.00 - 41.00 %    Monocytes 7.50 0.00 - 13.40 %    Eosinophils 0.10 0.00 - 6.90 %    Basophils 0.40 0.00 - 1.80 %    Immature Granulocytes 0.40 0.00 - 0.90 %    Nucleated RBC 0.00 /100 WBC    Neutrophils (Absolute) 12.36 (H) 1.82 - 7.42 K/uL    Lymphs (Absolute) 0.62 (L) 1.00 - 4.80 K/uL    Monos (Absolute) 1.06 (H) 0.00 - 0.85 K/uL    Eos (Absolute) 0.02 0.00 - 0.51 K/uL    Baso (Absolute) 0.05 0.00 - 0.12 K/uL    Immature Granulocytes (abs) 0.06 0.00 - 0.11 K/uL    NRBC (Absolute) 0.00 K/uL   COMP METABOLIC PANEL   Result Value Ref Range    Sodium 136 135 - 145 mmol/L    Potassium 3.5 (L) 3.6 - 5.5 mmol/L    Chloride 99 96 - 112 mmol/L    Co2 24 20 - 33 mmol/L    Anion Gap 13.0 7.0 - 16.0    Glucose 119 (H) 65 - 99 mg/dL    Bun 11 8 - 22 mg/dL    Creatinine 0.85 0.50 - 1.40 mg/dL    Calcium 10.0 8.5 - 10.5 mg/dL    AST(SGOT) 46 (H) 12 - 45 U/L    ALT(SGPT) 66 (H) 2 - 50 U/L    Alkaline Phosphatase 90 30 - 99 U/L    Total Bilirubin 0.4 0.1 - 1.5 mg/dL    Albumin 5.0 (H) 3.2 - 4.9 g/dL    Total Protein 7.8 6.0 - 8.2 g/dL    Globulin 2.8 1.9 - 3.5 g/dL    A-G Ratio 1.8 g/dL   MAGNESIUM   Result Value Ref Range    Magnesium 2.2 1.5 - 2.5 mg/dL   ESTIMATED GFR   Result Value Ref Range    GFR If African American >60 >60 mL/min/1.73 m 2    GFR If Non African American >60 >60 mL/min/1.73 m 2        RADIOLOGY  The radiologist's interpretation of all radiological studies have been reviewed by me.     CT-MAXILLOFACIAL W/O PLUS RECONS   Final Result         1.  Bilateral anterior dislocation of the TMJs      DX-TEMPOROMANDIBULAR JOINTS (TMJ)   Final Result         1.  Evaluation the TMJs is limited, further evaluation with CT of the mandible for further characterization.             COURSE & MEDICAL DECISION MAKING  Nursing notes, VS, PMSFHx reviewed in chart.     Review of past medical records shows the patient without recent visits to this ED.      Differential diagnoses considered include  but are not limited to: TMJ dislocation, tenderness, muscle spasm      History and physical exam as above.  Initial x-rays with findings as above.  Patient subsequently underwent CT scan with findings as above.  He was given Valium along with Zofran and morphine initially.  I discussed the findings with the patient and significant other and they are agreeable to procedural sedation for reduction.  Reduction was performed as above without any complications and patient was monitored until return to baseline.  On recheck, he reports feeling much better and is able to move his jaw without difficulty.  He is noted to be in no acute distress and nontoxic in appearance.  No signs of airway impairment.  Leukocytosis is likely stress reaction rather than acute infectious etiology.  Patient with mild elevation of transaminases but without any abdominal complaint or discomfort.  He will be given outpatient referral to ENT for follow-up recheck of his TMJ dislocation.  He was advised on soft diet and not yawning to prevent recurrence.  Return to ED precautions were given.  Patient also noted to have elevated blood pressure readings and will need outpatient follow-up for further management.  Patient and significant other verbalized understanding and agreed with plan of care with no further questions or concerns.      The patient is referred to a primary physician for blood pressure management, diabetic screening, and for all other preventative health concerns.       FINAL IMPRESSION  1. Dislocation of temporomandibular joint, initial encounter Acute          DISPOSITION  Patient will be discharged home in stable condition.       FOLLOW UP  Fang Andrade M.D.  [address] [place] [id number]  [phone]    Call in 1 day      [facility], Emergency Dept  [address] [facility] [address] [phone]    If symptoms worsen         OUTPATIENT MEDICATIONS  Discharge Medication List as of 3/17/2021  11:52 PM             Electronically signed by: Driss Plaza D.O., 3/17/2021 8:30 PM      Portions of this record were made with voice recognition software.  Despite my review, spelling/grammar/context errors may still remain.  Interpretation of this chart should be taken in this context.

## 2021-03-18 NOTE — ED NOTES
Patient is now awake, alert and asking and answering appropriate questions.  No needs at this time.  Will continue to assess.

## 2021-03-18 NOTE — ED NOTES
Patient is sitting up awake, alert and appropriate with no obvious signs or symptoms of distress.  Tolerated apple juice without difficulty.  Will now prepare for discharge.

## 2021-03-18 NOTE — ED NOTES
2240 -   ERP, RN and RT to bedside for sedation.  Consents for reduction of bilateral TMJs were signed and placed in the chart.  Time out was called at 2250.  Will continue to assess.

## 2021-03-18 NOTE — ED NOTES
Patient resting on gurney and is aware that plan of care includes x-rays.  No further questions at this time.  Will continue to assess.

## 2021-06-09 ENCOUNTER — OFFICE VISIT (OUTPATIENT)
Dept: NEUROLOGY | Facility: MEDICAL CENTER | Age: 31
End: 2021-06-09
Attending: NURSE PRACTITIONER
Payer: COMMERCIAL

## 2021-06-09 VITALS
HEIGHT: 71 IN | HEART RATE: 66 BPM | RESPIRATION RATE: 20 BRPM | SYSTOLIC BLOOD PRESSURE: 118 MMHG | OXYGEN SATURATION: 100 % | WEIGHT: 192.9 LBS | TEMPERATURE: 97.5 F | BODY MASS INDEX: 27.01 KG/M2 | DIASTOLIC BLOOD PRESSURE: 80 MMHG

## 2021-06-09 DIAGNOSIS — G43.C1 INTRACTABLE PERIODIC HEADACHE SYNDROME: ICD-10-CM

## 2021-06-09 DIAGNOSIS — G47.9 SLEEP DISORDER: ICD-10-CM

## 2021-06-09 DIAGNOSIS — R56.9 SEIZURE (HCC): ICD-10-CM

## 2021-06-09 PROBLEM — E78.5 HYPERLIPIDEMIA: Status: ACTIVE | Noted: 2020-12-15

## 2021-06-09 PROCEDURE — 99211 OFF/OP EST MAY X REQ PHY/QHP: CPT | Performed by: NURSE PRACTITIONER

## 2021-06-09 PROCEDURE — 99213 OFFICE O/P EST LOW 20 MIN: CPT | Performed by: NURSE PRACTITIONER

## 2021-06-09 RX ORDER — ELETRIPTAN HYDROBROMIDE 40 MG/1
TABLET, FILM COATED ORAL
Qty: 9 TABLET | Refills: 2 | Status: SHIPPED | OUTPATIENT
Start: 2021-06-09 | End: 2021-08-11

## 2021-06-09 RX ORDER — TRAZODONE HYDROCHLORIDE 100 MG/1
TABLET ORAL
Qty: 90 TABLET | Refills: 0 | Status: SHIPPED | OUTPATIENT
Start: 2021-06-09 | End: 2021-11-29 | Stop reason: SDUPTHER

## 2021-06-09 ASSESSMENT — PATIENT HEALTH QUESTIONNAIRE - PHQ9: CLINICAL INTERPRETATION OF PHQ2 SCORE: 0

## 2021-06-09 ASSESSMENT — ENCOUNTER SYMPTOMS
COUGH: 0
BACK PAIN: 1
VOMITING: 0
DOUBLE VISION: 0
INSOMNIA: 1
HEADACHES: 1
ABDOMINAL PAIN: 0
CONSTITUTIONAL NEGATIVE: 1
NAUSEA: 0
DEPRESSION: 0
SORE THROAT: 0
NERVOUS/ANXIOUS: 0
SEIZURES: 0
DIARRHEA: 0

## 2021-06-09 ASSESSMENT — FIBROSIS 4 INDEX: FIB4 SCORE: 0.56

## 2021-06-09 NOTE — PROGRESS NOTES
Subjective:      Tavares Knowles is a 30 y.o. male who presents with No chief complaint on file.        Last seen 7/2020.    HPI    Migraine Headaches: sometimes recognized after the end of a work day.  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  Headache onset is most often recognized in the evening hours..    Caffeine consumption is 200-300mg per day and stops caffeine by noon.  Suspicious that he had undiagnosed and treated ADD.  He responds well to caffeine but has a very difficult time falling asleep and staying asleep.    Migraines are relieved by Relpax if unrelieved with ibuprofen first.    April 15, 2017 suspected seizure, 1pm.  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  Went to give a high-five to a friend then blacked out.  He did twist off the side of the truck and fell.        3/17/2021 CT IMPRESSION:      1.  Bilateral anterior dislocation of the TMJs      Followed per Suhas Mccartney and had extensive x-rays.  Diagnosed with other hairline fractures.  Rested from lifting weights for about 6 months and resumed in the gym in March 2021.  He is trying to alternate his routines to cause further back strain.      MRI IMPRESSION:  1.  Acute/subacute mild T7 superior endplate compression fracture.  2.  Chronic mild T6 superior endplate compression fracture.  3.  No significant thoracic spinal or foraminal stenosis.    11/2020 EEG INTERPRETATION:  This is a normal routine EEG recording in the awake and drowsy state(s).  Clinical correlation is recommended.     Note: A normal EEG does not rule out epilepsy.  If the clinical suspicion remains high for seizures, a prolonged recording to capture clinical or subclinical events may be helpful.      Relpax-- has taken rarely (9 tablets total in the past year).    Current Outpatient Medications   Medication Sig Dispense Refill   • traZODone (DESYREL) 100 MG Tab TAKE 1/2 TO 1 TABLET AS    NEEDED FOR SLEEP 90 tablet 0   • eletriptan (RELPAX) 40 MG  tablet Take 1/2-1 tablet po at onset of headache, may repeat dose in 2 hours if unrelieved.  Do not exceed more than 2 tablets in 24 hours. 9 Tab 2     No current facility-administered medications for this visit.       Review of Systems   Constitutional: Negative.    HENT: Negative for hearing loss, nosebleeds and sore throat.         No recent head injury.   Eyes: Negative for double vision.        No new loss of vision.   Respiratory: Negative for cough.         No recent lung infections.   Cardiovascular: Negative for chest pain.   Gastrointestinal: Negative for abdominal pain, diarrhea, nausea and vomiting.   Genitourinary: Negative.    Musculoskeletal: Positive for back pain.   Skin: Negative.    Neurological: Positive for headaches. Negative for seizures.   Endo/Heme/Allergies:        No history of endocrine dysfunction.  No new problems.   Psychiatric/Behavioral: Negative for depression. The patient has insomnia. The patient is not nervous/anxious (racing thoughts).         No recent mood changes.          Objective:     There were no vitals taken for this visit.     Physical Exam  Constitutional:       General: He is not in acute distress.  HENT:      Head: Normocephalic and atraumatic.      Mouth/Throat:      Mouth: Mucous membranes are moist.   Eyes:      Pupils: Pupils are equal, round, and reactive to light.   Cardiovascular:      Rate and Rhythm: Normal rate and regular rhythm.   Pulmonary:      Effort: Pulmonary effort is normal. No respiratory distress.      Breath sounds: Normal breath sounds.   Musculoskeletal:         General: Normal range of motion.      Cervical back: Normal range of motion.      Comments: Guarded mid to lower back     Skin:     General: Skin is warm and dry.   Neurological:      Mental Status: He is alert and oriented to person, place, and time.      Cranial Nerves: No cranial nerve deficit.      Motor: No abnormal muscle tone.      Coordination: Coordination normal.      Deep  Tendon Reflexes: Reflexes are normal and symmetric.   Psychiatric:         Attention and Perception: He is inattentive.         Mood and Affect: Mood normal.              Neurological Exam  Mental Status  Alert.    Cranial Nerves  CN III, IV, VI: Pupils equal round and reactive to light bilaterally.    Reflexes  Deep tendon reflexes are 2+ and symmetric in all four extremities with downgoing toes bilaterally.           Assessment/Plan:         One Time Possible Seizure:  No other concern for seizures since one time seizure occuring April 15, 2017.  No concern for aura, childhood febrile seizure, etc.     Lengthy conversation regarding the slightly abnormal EEG.  6/2017 INTERPRETATION:  This EEG denotes of one episode of probable epileptiform sharp waves from the frontal region with potential of epileptiform disorder.      Migraine Headaches:  Typical headache:  No aura.  Photophobia, pulsing pain in the frontal region.  Will be left with general malaise after the pain subsides.       Headaches began early spring 2020.  Occurring about one time per week.  Pain onset is in the evening hours usually.  More prominent during the work week and not on the weekend  Effective relief with NSAID of choice and sometimes needs Relpax as 2nd line treatment.    Consider Calm powder nightly.    Updated Rx for trazodone 100mg-150mg qhs.    He has used Vyvanse in the morning but found it very difficult to sleep.  Strong possibility of adult ADD that is untreated.    Return for follow-up in 6 months or sooner if needed.

## 2021-07-20 ENCOUNTER — HOSPITAL ENCOUNTER (EMERGENCY)
Facility: MEDICAL CENTER | Age: 31
End: 2021-07-21
Attending: EMERGENCY MEDICINE
Payer: COMMERCIAL

## 2021-07-20 VITALS
SYSTOLIC BLOOD PRESSURE: 141 MMHG | TEMPERATURE: 97 F | HEART RATE: 69 BPM | RESPIRATION RATE: 18 BRPM | HEIGHT: 72 IN | BODY MASS INDEX: 26.41 KG/M2 | OXYGEN SATURATION: 96 % | WEIGHT: 195 LBS | DIASTOLIC BLOOD PRESSURE: 84 MMHG

## 2021-07-20 DIAGNOSIS — R56.9 SEIZURE (HCC): ICD-10-CM

## 2021-07-20 DIAGNOSIS — S03.00XA DISLOCATION OF TEMPOROMANDIBULAR JOINT, INITIAL ENCOUNTER: ICD-10-CM

## 2021-07-20 LAB
ALBUMIN SERPL BCP-MCNC: 4.8 G/DL (ref 3.2–4.9)
ALBUMIN/GLOB SERPL: 1.8 G/DL
ALP SERPL-CCNC: 80 U/L (ref 30–99)
ALT SERPL-CCNC: 31 U/L (ref 2–50)
ANION GAP SERPL CALC-SCNC: 18 MMOL/L (ref 7–16)
AST SERPL-CCNC: 28 U/L (ref 12–45)
BASOPHILS # BLD AUTO: 0.8 % (ref 0–1.8)
BASOPHILS # BLD: 0.07 K/UL (ref 0–0.12)
BILIRUB SERPL-MCNC: 0.3 MG/DL (ref 0.1–1.5)
BUN SERPL-MCNC: 13 MG/DL (ref 8–22)
CALCIUM SERPL-MCNC: 9.3 MG/DL (ref 8.5–10.5)
CHLORIDE SERPL-SCNC: 100 MMOL/L (ref 96–112)
CO2 SERPL-SCNC: 19 MMOL/L (ref 20–33)
CREAT SERPL-MCNC: 1.03 MG/DL (ref 0.5–1.4)
EKG IMPRESSION: NORMAL
EOSINOPHIL # BLD AUTO: 0.29 K/UL (ref 0–0.51)
EOSINOPHIL NFR BLD: 3.4 % (ref 0–6.9)
ERYTHROCYTE [DISTWIDTH] IN BLOOD BY AUTOMATED COUNT: 39.8 FL (ref 35.9–50)
GLOBULIN SER CALC-MCNC: 2.6 G/DL (ref 1.9–3.5)
GLUCOSE SERPL-MCNC: 138 MG/DL (ref 65–99)
HCT VFR BLD AUTO: 46.8 % (ref 42–52)
HGB BLD-MCNC: 16.9 G/DL (ref 14–18)
IMM GRANULOCYTES # BLD AUTO: 0.04 K/UL (ref 0–0.11)
IMM GRANULOCYTES NFR BLD AUTO: 0.5 % (ref 0–0.9)
LYMPHOCYTES # BLD AUTO: 2.82 K/UL (ref 1–4.8)
LYMPHOCYTES NFR BLD: 32.7 % (ref 22–41)
MCH RBC QN AUTO: 32.2 PG (ref 27–33)
MCHC RBC AUTO-ENTMCNC: 36.1 G/DL (ref 33.7–35.3)
MCV RBC AUTO: 89.1 FL (ref 81.4–97.8)
MONOCYTES # BLD AUTO: 0.74 K/UL (ref 0–0.85)
MONOCYTES NFR BLD AUTO: 8.6 % (ref 0–13.4)
NEUTROPHILS # BLD AUTO: 4.67 K/UL (ref 1.82–7.42)
NEUTROPHILS NFR BLD: 54 % (ref 44–72)
NRBC # BLD AUTO: 0 K/UL
NRBC BLD-RTO: 0 /100 WBC
PLATELET # BLD AUTO: 301 K/UL (ref 164–446)
PMV BLD AUTO: 10.7 FL (ref 9–12.9)
POTASSIUM SERPL-SCNC: 3.6 MMOL/L (ref 3.6–5.5)
PROT SERPL-MCNC: 7.4 G/DL (ref 6–8.2)
RBC # BLD AUTO: 5.25 M/UL (ref 4.7–6.1)
SODIUM SERPL-SCNC: 137 MMOL/L (ref 135–145)
WBC # BLD AUTO: 8.6 K/UL (ref 4.8–10.8)

## 2021-07-20 PROCEDURE — 700111 HCHG RX REV CODE 636 W/ 250 OVERRIDE (IP): Performed by: EMERGENCY MEDICINE

## 2021-07-20 PROCEDURE — 85025 COMPLETE CBC W/AUTO DIFF WBC: CPT

## 2021-07-20 PROCEDURE — 700102 HCHG RX REV CODE 250 W/ 637 OVERRIDE(OP): Performed by: EMERGENCY MEDICINE

## 2021-07-20 PROCEDURE — 80053 COMPREHEN METABOLIC PANEL: CPT

## 2021-07-20 PROCEDURE — 99285 EMERGENCY DEPT VISIT HI MDM: CPT

## 2021-07-20 PROCEDURE — 96374 THER/PROPH/DIAG INJ IV PUSH: CPT

## 2021-07-20 PROCEDURE — 93005 ELECTROCARDIOGRAM TRACING: CPT | Performed by: EMERGENCY MEDICINE

## 2021-07-20 PROCEDURE — 93005 ELECTROCARDIOGRAM TRACING: CPT

## 2021-07-20 PROCEDURE — 302875 HCHG BANDAGE ACE 4 OR 6""

## 2021-07-20 PROCEDURE — A9270 NON-COVERED ITEM OR SERVICE: HCPCS | Performed by: EMERGENCY MEDICINE

## 2021-07-20 PROCEDURE — 96375 TX/PRO/DX INJ NEW DRUG ADDON: CPT

## 2021-07-20 PROCEDURE — 21480 CLTX TMPRMAND DISLC 1ST/SBSQ: CPT

## 2021-07-20 RX ORDER — LEVETIRACETAM 500 MG/1
500 TABLET ORAL ONCE
Status: COMPLETED | OUTPATIENT
Start: 2021-07-20 | End: 2021-07-20

## 2021-07-20 RX ORDER — LEVETIRACETAM 500 MG/1
500 TABLET ORAL 2 TIMES DAILY
Qty: 60 TABLET | Refills: 0 | Status: SHIPPED | OUTPATIENT
Start: 2021-07-20 | End: 2021-08-11

## 2021-07-20 RX ORDER — PROPOFOL 10 MG/ML
1 INJECTION, EMULSION INTRAVENOUS ONCE
Status: COMPLETED | OUTPATIENT
Start: 2021-07-20 | End: 2021-07-20

## 2021-07-20 RX ORDER — HYDROCODONE BITARTRATE AND ACETAMINOPHEN 5; 325 MG/1; MG/1
1 TABLET ORAL EVERY 6 HOURS PRN
Qty: 9 TABLET | Refills: 0 | Status: SHIPPED | OUTPATIENT
Start: 2021-07-20 | End: 2021-07-23

## 2021-07-20 RX ADMIN — LEVETIRACETAM 500 MG: 500 TABLET ORAL at 23:55

## 2021-07-20 RX ADMIN — FENTANYL CITRATE 50 MCG: 50 INJECTION, SOLUTION INTRAMUSCULAR; INTRAVENOUS at 22:37

## 2021-07-20 RX ADMIN — PROPOFOL 89 MG: 10 INJECTION, EMULSION INTRAVENOUS at 22:53

## 2021-07-20 ASSESSMENT — FIBROSIS 4 INDEX: FIB4 SCORE: 0.58

## 2021-07-21 ASSESSMENT — LIFESTYLE VARIABLES
DO YOU DRINK ALCOHOL: NO
DOES PATIENT WANT TO STOP DRINKING: NO

## 2021-07-21 NOTE — ED TRIAGE NOTES
Chief Complaint   Patient presents with   • Seizure     Pt BIB EMS for new onset seizure. Tonic clonic x1min with no precipitating factors. Pt hit head on wall during onset. Now complaining of lock jaw with possible dislocation. Pt was postictal on scene but is now aaox4. Pt has hx of 1 prior seizure with no residual issues and was evaluated with no long term Rx.

## 2021-07-21 NOTE — PROGRESS NOTES
Brief phone conversation ERP    31 male presenting with tonic-clonic seizures afternoon.  This third event.  First 1 was back in 2017.  An EEG in thousand 17 mention a frontal lobe spike.  EEG 2/1/2020 was normal.  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  Recommend starting Keppra 500 his twice daily.  No driving.  MRI brain was unremarkable thousand 17.  Follow-up neurology clinic.

## 2021-07-21 NOTE — ED PROVIDER NOTES
ED Provider Note    CHIEF COMPLAINT  Chief Complaint   Patient presents with   • Seizure       HPI  Tavares Knowles is a 31 y.o. male who presents after a witnessed seizure event lasting about a minute.  Had a postictal state and apparent jaw dislocation.  Patient had a first time seizure back in 2017 and he is followed by neurology.  According to medical record, he was last seen a month ago by the neurology department.  He had a second time seizure back in March of this year when he also had a jaw dislocation.  He had procedural sedation to reduce the jaw dislocation and was discharged home.  He has not followed up with ENT.    Patient denies any recent fevers.  No recent head trauma.  Did not have any significant trauma as a result of the seizure event today.  He is currently neurologically intact.    REVIEW OF SYSTEMS  See HPI for further details. All other systems are negative.     PAST MEDICAL HISTORY   has a past medical history of Overweight (BMI 25.0-29.9).    SOCIAL HISTORY  Social History     Tobacco Use   • Smoking status: Never Smoker   • Smokeless tobacco: Never Used   Vaping Use   • Vaping Use: Never used   Substance and Sexual Activity   • Alcohol use: No     Comment: OCC once per week   • Drug use: No   • Sexual activity: Yes     Partners: Female     Comment: pt partner on BC       SURGICAL HISTORY  patient denies any surgical history    CURRENT MEDICATIONS  Home Medications    **Home medications have not yet been reviewed for this encounter**         ALLERGIES  No Known Allergies    PHYSICAL EXAM  VITAL SIGNS: /94   Pulse 75   Temp 35.9 °C (96.6 °F) (Temporal)   Resp 18   Ht 1.829 m (6')   Wt 88.5 kg (195 lb)   SpO2 91%   BMI 26.45 kg/m²   Pulse ox interpretation: I interpret this pulse ox as normal.  Constitutional: Alert in no apparent distress.  HENT: No signs of trauma, Bilateral external ears normal, Nose normal.  Unable to fully close the jaw.  It appears displaced  anteriorly.  Pain to the TMJ bilaterally.  Eyes: Pupils are equal and reactive, Conjunctiva normal, Non-icteric.   Neck: Normal range of motion, No tenderness, Supple, No stridor.   Cardiovascular: Regular rate and rhythm.   Thorax & Lungs: Normal breath sounds, No respiratory distress  Skin: Warm, Dry, No erythema, No rash.   Musculoskeletal: Good range of motion in all major joints. No major deformities noted.   Neurologic: Alert, Normal motor function and gait, Normal sensory function, No focal deficits noted.       DIAGNOSTIC STUDIES / PROCEDURES      LABS  Labs Reviewed   CBC WITH DIFFERENTIAL - Abnormal; Notable for the following components:       Result Value    MCHC 36.1 (*)     All other components within normal limits   COMP METABOLIC PANEL - Abnormal; Notable for the following components:    Co2 19 (*)     Anion Gap 18.0 (*)     Glucose 138 (*)     All other components within normal limits   ESTIMATED GFR         RADIOLOGY  No orders to display     Conscious Sedation Procedure Note    Indication: procedural pain management and jaw dislocation    Consent: I have discussed with the patient and/or the patient representative the indication, alternatives, and the possible risks and/or complications of the planned procedure and the anesthesia methods. The patient and/or patient representative appear to understand and agree to proceed.    Physician Involvement: The attending physician was present and supervising this procedure.    Pre-Sedation Documentation and Exam: I have personally completed a history, physical exam & review of systems for this patient (see notes).  Airway Assessment: normal, dentition not prohibitive, normal neck range of motion, Mallampati Class II - (soft palate, fauces & uvula are visible)  f3  Prior History of Anesthesia Complications: none    ASA Classification: Class 1 - A normal healthy patient    Sedation/ Anesthesia Plan: intravenous sedation    Medications Used: propofol  intravenously    Monitoring and Safety: The patient was placed on a cardiac monitor and vital signs, pulse oximetry and level of consciousness were continuously evaluated throughout the procedure. The patient was closely monitored until recovery from the medications was complete and the patient had returned to baseline status. Respiratory therapy was on standby at all times during the procedure.      (The following sections must be completed)  Post-Sedation Vital Signs: Vital signs were reviewed and were stable after the procedure (see flow sheet for vitals)            Intraservice Time: Greater than 10 minutes    Post-Sedation Exam: Lungs: clear and Cardiovascular: normal, regular rate and rhythm           Complications: none    I provided both the sedation and procedure, a nurse was present at the bedside for the entire procedure.       Joint Reduction Procedure Note    Indication: Joint dislocation    Consent: The patient was counseled regarding the procedure, it's indications, risks, potential complications and alternatives and any questions were answered. Consent was obtained.    Procedure: The pre-reduction exam showed distal perfusion & neurologic function to be normal and distal perfusion to be normal. The patient was placed in the sitting position. Anesthesia/pain control was obtained using conscious sedation with propofol intravenously. Reduction of the mandible was performed by downward traction using thumbs pressing downward on the lower molars. Post reduction films were not obtained. A post-reduction exam revealed distal perfusion & neurologic function to be normal. The affected area was immobilized with head wrap.    The patient tolerated the procedure well.    Complications: None        COURSE & MEDICAL DECISION MAKING    Medications   levETIRAcetam (KEPPRA) tablet 500 mg (has no administration in time range)   propofol (DIPRIVAN) 20mL vial injection (RWN) (89 mg Intravenous Given 7/20/21 8010)    fentaNYL (SUBLIMAZE) injection 50 mcg (50 mcg Intravenous Given 7/20/21 2237)       Pertinent Labs & Imaging studies reviewed. (See chart for details)  31 y.o. male presenting after a seizure event lasting about a minute.  Has a history of seizures.  This is a third time seizure since 2017 though he has had 2 in the past 4 months.  He is already followed by neurology, last seen a month ago.  Laboratory studies were performed which were largely unremarkable.  Normal glucose.  No significant metabolic abnormalities.  No recent illness or fevers.  No recent head trauma.  No focal neurologic deficits.  No witnessed seizure activity here in the emergency department.    I did speak briefly with the on-call neurologist, Dr. Headley.  He is recommending starting Keppra 500 mg twice daily and to follow-up with neurology.  Patient jaw was wrapped with an Ace wrap.  He was instructed to avoid opening his jaw widely over the next several weeks and to follow-up with neurology and ENT.  The patient was discouraged from swimming or driving as these activities can place him and others at significant risk of injury and or death.    The patient does have an apparent mandible dislocation as well likely related to seizure event today.  No history of significant trauma to the face.  He is unable to fully close his jaw.  Has pain to bilateral TMJ regions.  Clinically, has an obvious mandible dislocation.  I did attempt to reduce the mandible dislocation without sedation however was unsuccessful.  Procedural sedation was used for reduction and the mandible reduced easily.  After sedation, patient notes that he feels much improved and is able to speak normally and fully close his mouth.  Tolerated sedation very well.  Head was wrapped with an Ace wrap to help support his jaw.  He was referred to ENT during his last visit here to the emergency department.  At that time he was referred to Dr. Andrade.  He was referred once again to the  same ENT.    The patient notes that after his prior jaw reduction he had significant pain the next day that was not responsive to typical over-the-counter medications and he is requesting some oral analgesic medications.    In prescribing controlled substances to this patient, I certify that I have obtained and reviewed the medical history of Tavares Knowles. I have also made a good cameron effort to obtain applicable records from other providers who have treated the patient and records did not demonstrate any increased risk of substance abuse that would prevent me from prescribing controlled substances.     I have conducted a physical exam and documented it. I have reviewed Mr. Knowles’s prescription history as maintained by the [place] Prescription Monitoring Program.     I have assessed the patient’s risk for abuse, dependency, and addiction using the validated Opioid Risk Tool available at https://www.mdcalc.com/foqbmc-uyez-ritw-ort-narcotic-abuse.     Given the above, I believe the benefits of controlled substance therapy outweigh the risks. The reasons for prescribing controlled substances include non-narcotic, oral analgesic alternatives have been inadequate for pain control. Accordingly, I have discussed the risk and benefits, treatment plan, and alternative therapies with the patient.       The patient will not drink alcohol nor drive with prescribed medications.   The patient was instructed to follow-up with primary care physician for further management.  To return immediately for any worsening symptoms or development of any other concerning signs or symptoms. The patient verbalizes understanding in their own words.    /84   Pulse 69   Temp 36.1 °C (97 °F) (Temporal)   Resp 18   Ht 1.829 m (6')   Wt 88.5 kg (195 lb)   SpO2 96%   BMI 26.45 kg/m²     The patient was referred to primary care where they will receive further BP management.      [facility], Emergency  Dept  [address] [facility] [id number] [phone]    As needed, If symptoms worsen    Primary care doctor    Schedule an appointment as soon as possible for a visit       ESTEBAN Barriso  [address] [id number] [phone]    Schedule an appointment as soon as possible for a visit   With your neurology provider.    Fang Andrade M.D.  [address] [facility] [id number] [phone]    Schedule an appointment as soon as possible for a visit   For jaw dislocation      FINAL IMPRESSION  1. Seizure (HCC)    2. Dislocation of temporomandibular joint, initial encounter            Electronically signed by: Nnamdi Abdul M.D., 7/20/2021 10:10 PM

## 2021-07-21 NOTE — ED NOTES
Staff    MD Son Abdul, NONI Munoz, RN  Josh, RRT  Travis, ED Tech    Timeout @2250    Verified pt, procedure, meds, and all O2/monitoring equipment.    89mg propofol IV given @ 2253 by MD Chantell    Reduction started and successful @2256    All VSS (see vitals flowsheet)    2353 Pt AAOx4. GCS 15. VSS.    Continuous monitoring ended at this time.    Will round on pt again in 10mins or sooner if needed.

## 2021-08-11 ENCOUNTER — OFFICE VISIT (OUTPATIENT)
Dept: MEDICAL GROUP | Facility: MEDICAL CENTER | Age: 31
End: 2021-08-11
Payer: COMMERCIAL

## 2021-08-11 VITALS
SYSTOLIC BLOOD PRESSURE: 120 MMHG | BODY MASS INDEX: 25.19 KG/M2 | TEMPERATURE: 98.5 F | WEIGHT: 186 LBS | OXYGEN SATURATION: 99 % | DIASTOLIC BLOOD PRESSURE: 82 MMHG | HEIGHT: 72 IN | HEART RATE: 74 BPM

## 2021-08-11 DIAGNOSIS — R56.9 SEIZURE (HCC): ICD-10-CM

## 2021-08-11 DIAGNOSIS — Z23 NEED FOR TDAP VACCINATION: ICD-10-CM

## 2021-08-11 DIAGNOSIS — M54.6 CHRONIC MIDLINE THORACIC BACK PAIN: ICD-10-CM

## 2021-08-11 DIAGNOSIS — G89.29 CHRONIC MIDLINE THORACIC BACK PAIN: ICD-10-CM

## 2021-08-11 DIAGNOSIS — Z87.81 HISTORY OF VERTEBRAL COMPRESSION FRACTURE: ICD-10-CM

## 2021-08-11 DIAGNOSIS — Z13.29 SCREENING FOR THYROID DISORDER: ICD-10-CM

## 2021-08-11 DIAGNOSIS — G47.00 INSOMNIA, UNSPECIFIED TYPE: ICD-10-CM

## 2021-08-11 DIAGNOSIS — L20.9 ATOPIC DERMATITIS, UNSPECIFIED TYPE: ICD-10-CM

## 2021-08-11 DIAGNOSIS — E78.5 HYPERLIPIDEMIA LDL GOAL <100: ICD-10-CM

## 2021-08-11 DIAGNOSIS — Z13.89 SCREENING FOR MULTIPLE CONDITIONS: ICD-10-CM

## 2021-08-11 DIAGNOSIS — R53.83 FATIGUE, UNSPECIFIED TYPE: ICD-10-CM

## 2021-08-11 DIAGNOSIS — R04.0 EPISTAXIS, RECURRENT: ICD-10-CM

## 2021-08-11 DIAGNOSIS — Z13.0 SCREENING, ANEMIA, DEFICIENCY, IRON: ICD-10-CM

## 2021-08-11 DIAGNOSIS — Z13.21 ENCOUNTER FOR VITAMIN DEFICIENCY SCREENING: ICD-10-CM

## 2021-08-11 DIAGNOSIS — G43.009 MIGRAINE WITHOUT AURA AND WITHOUT STATUS MIGRAINOSUS, NOT INTRACTABLE: ICD-10-CM

## 2021-08-11 DIAGNOSIS — L30.9 ECZEMA, UNSPECIFIED TYPE: ICD-10-CM

## 2021-08-11 PROCEDURE — 90715 TDAP VACCINE 7 YRS/> IM: CPT | Performed by: NURSE PRACTITIONER

## 2021-08-11 PROCEDURE — 90471 IMMUNIZATION ADMIN: CPT | Performed by: NURSE PRACTITIONER

## 2021-08-11 PROCEDURE — 99214 OFFICE O/P EST MOD 30 MIN: CPT | Mod: 25 | Performed by: NURSE PRACTITIONER

## 2021-08-11 RX ORDER — ELETRIPTAN HYDROBROMIDE 40 MG/1
TABLET, FILM COATED ORAL
Qty: 9 TABLET | Refills: 2 | Status: SHIPPED | OUTPATIENT
Start: 2021-08-11 | End: 2023-06-21

## 2021-08-11 RX ORDER — TACROLIMUS 1 MG/G
1 OINTMENT TOPICAL 2 TIMES DAILY
Qty: 30 G | Refills: 0
Start: 2021-08-11 | End: 2021-08-11 | Stop reason: SDUPTHER

## 2021-08-11 RX ORDER — TACROLIMUS 1 MG/G
1 OINTMENT TOPICAL 2 TIMES DAILY
Qty: 30 G | Refills: 0 | Status: SHIPPED | OUTPATIENT
Start: 2021-08-11 | End: 2023-06-21

## 2021-08-11 RX ORDER — LEVETIRACETAM 500 MG/1
500 TABLET ORAL 2 TIMES DAILY
Qty: 60 TABLET | Refills: 0 | Status: SHIPPED | OUTPATIENT
Start: 2021-08-11 | End: 2021-11-10 | Stop reason: SDUPTHER

## 2021-08-11 ASSESSMENT — FIBROSIS 4 INDEX: FIB4 SCORE: 0.48
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
## 2021-08-12 NOTE — ASSESSMENT & PLAN NOTE
His last lab in 12/20 shows high LDL but he was on a very poor diet.  He has since improved his diet.  His father and brother both have hyperlipidemia.  Not on meds.

## 2021-08-12 NOTE — ASSESSMENT & PLAN NOTE
He is on trazodone for sleep.  He has active mind and inability to calm down at hs.  It will help him sleep for 2-3 hrs. He is trying to come off the med.  He is slowly weaning off.  He tried ambien that caused him to sleep walk.

## 2021-08-12 NOTE — ASSESSMENT & PLAN NOTE
He has eczema all over his body.  It is life long.  It is more stable but getting in ears and corner of eyes.  Also on his hands. He has tried antifungal and steroid cream.  + itching.  protopic has helped in the past but $$

## 2021-08-12 NOTE — ASSESSMENT & PLAN NOTE
His last MRI thoracic showed compression fx x 2.  He did not go to medical care iniitally.  He has done PT and steroid injections.  He initially had tx 4 hrs ago.  He does not want narcotics.
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
## 2021-08-12 NOTE — ASSESSMENT & PLAN NOTE
Has had several seizures in the hpast.  Has had complete w/u with no specific dx.  He eats a healthy diet.

## 2021-08-26 LAB
25(OH)D3+25(OH)D2 SERPL-MCNC: 45.6 NG/ML (ref 30–100)
ALBUMIN SERPL-MCNC: 4.9 G/DL (ref 4.1–5.2)
ALBUMIN/GLOB SERPL: 2.2 {RATIO} (ref 1.2–2.2)
ALP SERPL-CCNC: 83 IU/L (ref 48–121)
ALT SERPL-CCNC: 25 IU/L (ref 0–44)
AST SERPL-CCNC: 29 IU/L (ref 0–40)
BASOPHILS # BLD AUTO: 0.1 X10E3/UL (ref 0–0.2)
BASOPHILS NFR BLD AUTO: 2 %
BILIRUB SERPL-MCNC: 0.7 MG/DL (ref 0–1.2)
BUN SERPL-MCNC: 12 MG/DL (ref 6–20)
BUN/CREAT SERPL: 12 (ref 9–20)
CALCIUM SERPL-MCNC: 9.7 MG/DL (ref 8.7–10.2)
CHLORIDE SERPL-SCNC: 101 MMOL/L (ref 96–106)
CHOLEST SERPL-MCNC: 249 MG/DL (ref 100–199)
CO2 SERPL-SCNC: 21 MMOL/L (ref 20–29)
CREAT SERPL-MCNC: 0.97 MG/DL (ref 0.76–1.27)
EOSINOPHIL # BLD AUTO: 0.1 X10E3/UL (ref 0–0.4)
EOSINOPHIL NFR BLD AUTO: 3 %
ERYTHROCYTE [DISTWIDTH] IN BLOOD BY AUTOMATED COUNT: 12.2 % (ref 11.6–15.4)
FOLATE SERPL-MCNC: >20 NG/ML
GH SERPL-MCNC: 2.7 NG/ML (ref 0–10)
GLOBULIN SER CALC-MCNC: 2.2 G/DL (ref 1.5–4.5)
GLUCOSE SERPL-MCNC: 83 MG/DL (ref 65–99)
HCT VFR BLD AUTO: 47.7 % (ref 37.5–51)
HDLC SERPL-MCNC: 83 MG/DL
HGB BLD-MCNC: 16.7 G/DL (ref 13–17.7)
IMM GRANULOCYTES # BLD AUTO: 0 X10E3/UL (ref 0–0.1)
IMM GRANULOCYTES NFR BLD AUTO: 0 %
IMMATURE CELLS  115398: NORMAL
LABORATORY COMMENT REPORT: ABNORMAL
LDLC SERPL CALC-MCNC: 159 MG/DL (ref 0–99)
LYMPHOCYTES # BLD AUTO: 1.5 X10E3/UL (ref 0.7–3.1)
LYMPHOCYTES NFR BLD AUTO: 36 %
MCH RBC QN AUTO: 32.6 PG (ref 26.6–33)
MCHC RBC AUTO-ENTMCNC: 35 G/DL (ref 31.5–35.7)
MCV RBC AUTO: 93 FL (ref 79–97)
MONOCYTES # BLD AUTO: 0.5 X10E3/UL (ref 0.1–0.9)
MONOCYTES NFR BLD AUTO: 11 %
MORPHOLOGY BLD-IMP: NORMAL
NEUTROPHILS # BLD AUTO: 2.1 X10E3/UL (ref 1.4–7)
NEUTROPHILS NFR BLD AUTO: 48 %
NRBC BLD AUTO-RTO: NORMAL %
PLATELET # BLD AUTO: 261 X10E3/UL (ref 150–450)
POTASSIUM SERPL-SCNC: 4.2 MMOL/L (ref 3.5–5.2)
PROT SERPL-MCNC: 7.1 G/DL (ref 6–8.5)
RBC # BLD AUTO: 5.13 X10E6/UL (ref 4.14–5.8)
SODIUM SERPL-SCNC: 137 MMOL/L (ref 134–144)
T3FREE SERPL-MCNC: 3.3 PG/ML (ref 2–4.4)
T4 FREE SERPL-MCNC: 1.48 NG/DL (ref 0.82–1.77)
TESTOST FREE SERPL-MCNC: 10 PG/ML (ref 9.3–26.5)
TESTOST SERPL-MCNC: 398 NG/DL (ref 264–916)
THYROPEROXIDASE AB SERPL-ACNC: <8 IU/ML (ref 0–34)
TRIGL SERPL-MCNC: 46 MG/DL (ref 0–149)
TSH SERPL DL<=0.005 MIU/L-ACNC: 0.61 UIU/ML (ref 0.45–4.5)
VIT B12 SERPL-MCNC: 1225 PG/ML (ref 232–1245)
VLDLC SERPL CALC-MCNC: 7 MG/DL (ref 5–40)
WBC # BLD AUTO: 4.3 X10E3/UL (ref 3.4–10.8)

## 2021-09-16 ENCOUNTER — OFFICE VISIT (OUTPATIENT)
Dept: MEDICAL GROUP | Facility: MEDICAL CENTER | Age: 31
End: 2021-09-16
Payer: COMMERCIAL

## 2021-09-16 VITALS
OXYGEN SATURATION: 99 % | WEIGHT: 185.4 LBS | SYSTOLIC BLOOD PRESSURE: 126 MMHG | BODY MASS INDEX: 25.11 KG/M2 | HEIGHT: 72 IN | DIASTOLIC BLOOD PRESSURE: 82 MMHG | HEART RATE: 69 BPM | TEMPERATURE: 98.1 F

## 2021-09-16 DIAGNOSIS — E78.5 HYPERLIPIDEMIA LDL GOAL <130: ICD-10-CM

## 2021-09-16 PROCEDURE — 99213 OFFICE O/P EST LOW 20 MIN: CPT | Performed by: NURSE PRACTITIONER

## 2021-09-16 ASSESSMENT — FIBROSIS 4 INDEX: FIB4 SCORE: 0.64

## 2021-09-16 NOTE — PROGRESS NOTES
Subjective:     Tavares Knowles is a 31 y.o. male who presents with hyperlipidemia.    HPI:   Seen in f/u for hyperlipidemia.  He is feeling well.  He is on a very strict diet.  He measures all his food.  High protein but no fast foods.  Works out extensively.  Does drink caffeine.  Reviewed lab with pt.  TSH, T4, T3, TPO, HGH, D, testoterone, B12, folate, CMP, GFR, CBC is wnl  LP shows good trg and HDL.  LDL is high at 159.  This is chronic.  His whole family has it. Younger brother has been on statins in the past. Pt declines statins for now.      Patient Active Problem List    Diagnosis Date Noted   • History of vertebral compression fracture 08/11/2021   • Migraine without aura 08/11/2021   • Hyperlipidemia LDL goal <100 12/15/2020   • Atopic dermatitis, unspecified 07/25/2019   • Seizure (HCC) 04/23/2019   • Insomnia 04/18/2019   • Epistaxis, recurrent 04/18/2019   • Chronic midline thoracic back pain 07/17/2017       Current medicines (including changes today)  Current Outpatient Medications   Medication Sig Dispense Refill   • tacrolimus (PROTOPIC) 0.1 % Ointment Apply 1 Application topically 2 times a day. 30 g 0   • levETIRAcetam (KEPPRA) 500 MG Tab Take 1 Tablet by mouth 2 times a day. 60 Tablet 0   • eletriptan (RELPAX) 40 MG tablet Take 1/2-1 tablet po at onset of headache, may repeat dose in 2 hours if unrelieved.  Do not exceed more than 2 tablets in 24 hours. 9 Tablet 2   • traZODone (DESYREL) 100 MG Tab Take 1-1.5 tablets PO qhs. 90 tablet 0     No current facility-administered medications for this visit.       No Known Allergies    ROS  Constitutional: Negative. Negative for fever, chills, weight loss, malaise/fatigue and diaphoresis.   HENT: Negative. Negative for hearing loss, ear pain, nosebleeds, congestion, sore throat, neck pain, tinnitus and ear discharge.   Respiratory: Negative. Negative for cough, hemoptysis, sputum production, shortness of breath, wheezing and stridor.    Cardiovascular: Negative. Negative for chest pain, palpitations, orthopnea, claudication, leg swelling and PND.   Gastrointestinal: Denies nausea, vomiting, diarrhea, constipation, heartburn, melena or hematochezia.  Genitourinary: Denies dysuria, hematuria, urinary incontinence, frequency or urgency.        Objective:     /82 (BP Location: Right arm, Patient Position: Sitting)   Pulse 69   Temp 36.7 °C (98.1 °F) (Temporal)   Ht 1.829 m (6')   Wt 84.1 kg (185 lb 6.4 oz)   SpO2 99%  Body mass index is 25.14 kg/m².    Physical Exam:  Vitals reviewed.  Constitutional: Oriented to person, place, and time. appears well-developed and well-nourished. No distress.   Cardiovascular: Normal rate, regular rhythm, normal heart sounds and intact distal pulses. Exam reveals no gallop and no friction rub. No murmur heard. No carotid bruits.   Pulmonary/Chest: Effort normal and breath sounds normal. No stridor. No respiratory distress. no wheezes or rales. exhibits no tenderness.   Musculoskeletal: Normal range of motion. exhibits no edema. cara pedal pulses 2+.  Lymphadenopathy: No cervical or supraclavicular adenopathy.   Neurological: Alert and oriented to person, place, and time. exhibits normal muscle tone.  Skin: Skin is warm and dry. No diaphoresis.   Psychiatric: Normal mood and affect. Behavior is normal.      Assessment and Plan:     The following treatment plan was discussed:    1. Hyperlipidemia LDL goal <130      declines med.  LDL not at goal.  + genetic predisposition.  continue healthy diet and exercise. plan:  f/u 12/21 for PE.  needs for insurance discount.         Followup: Return in about 3 months (around 12/16/2021), or for PE.

## 2021-11-02 NOTE — MR AVS SNAPSHOT
Tavares Knowles   2017 8:40 AM   Office Visit   MRN: [id number]    Department:  Neurology [facility]   Dept Phone:  [phone]    Description:  Male : 1990   Provider:  Morris Espinal M.D.           Reason for Visit     New Patient seizures      Allergies as of 2017     No Known Allergies      You were diagnosed with     Seizure ([facility])   [[id number]]         Vital Signs     Blood Pressure Pulse Temperature Height Weight Body Mass Index    138/76 mmHg 66 36.7 °C (98.1 °F) 1.829 m (6') 91.627 kg (202 lb) 27.39 kg/m2    Oxygen Saturation Smoking Status                100% Never Smoker           Basic Information     Date Of Birth Sex Race Ethnicity Preferred Language    1990 Male White Non- English      Your appointments     2017  9:00 AM   ELECTROENCEPHALOGRAPHY with NEURODIAGNOSTIC LAB [facility] (--)    [address], [address]  [facility] [address] [phone]           Limit caffeine. Clean, dry hair, no gels, oils, or hairsprays. If testing for seizures or spells, please allow no more than 4 hours of sleep the night before the exam (sleep 12am-4am).              Health Maintenance     Patient has no pending health maintenance at this time      Current Immunizations     No immunizations on file.      Below and/or attached are the medications your provider expects you to take. Review all of your home medications and newly ordered medications with your provider and/or pharmacist. Follow medication instructions as directed by your provider and/or pharmacist. Please keep your medication list with you and share with your provider. Update the information when medications are discontinued, doses are changed, or new medications (including over-the-counter products) are added; and carry medication information at all times in the event of emergency situations     Allergies:  No Known Allergies          Medications  Valid as of: 2017 -  8:41  AM    Generic Name Brand Name Tablet Size Instructions for use    .                 Medicines prescribed today were sent to:     [id number] - [place], [place] - [address]    [address] [place] [address]    Phone: [phone] Fax: [phone]    Open 24 Hours?: No      Medication refill instructions:       If your prescription bottle indicates you have medication refills left, it is not necessary to call your provider’s office. Please contact your pharmacy and they will refill your medication.    If your prescription bottle indicates you do not have any refills left, you may request refills at any time through one of the following ways: The online [employer] system (except Urgent Care), by calling your provider’s office, or by asking your pharmacy to contact your provider’s office with a refill request. Medication refills are processed only during regular business hours and may not be available until the next business day. Your provider may request additional information or to have a follow-up visit with you prior to refilling your medication.   *Please Note: Medication refills are assigned a new Rx number when refilled electronically. Your pharmacy may indicate that no refills were authorized even though a new prescription for the same medication is available at the pharmacy. Please request the medicine by name with the pharmacy before contacting your provider for a refill.        Referral     A referral request has been sent to our patient care coordination department. Please allow 3-5 business days for us to process this request and contact you either by phone or mail. If you do not hear from us by the 5th business day, please call us at [phone].           [employer] Access Code: [email or web address]  Expires: 5/15/2017  4:06 PM    [employer]  A secure, online tool to manage your health information     [employer]’s [employer]® is a secure, online tool that connects you to your personalized health information  from the privacy of your home -- day or night - making it very easy for you to manage your healthcare. Once the activation process is completed, you can even access your medical information using the [employer] janey, which is available for free in the [employer] store or Google Play store.     Wickr provides the following levels of access (as shown below):   My Chart Features   [facility] Doctor [facility]  Specialists [facility]  Urgent  Care Non-[facility]  Primary Care  Doctor   Email your healthcare team securely and privately 24/7 X X X    Manage appointments: schedule your next appointment; view details of past/upcoming appointments X      Request prescription refills. X      View recent personal medical records, including lab and immunizations X X X X   View health record, including health history, allergies, medications X X X X   Read reports about your outpatient visits, procedures, consult and ER notes X X X X   See your discharge summary, which is a recap of your hospital and/or ER visit that includes your diagnosis, lab results, and care plan. X X       How to register for [employer]:  1. Go to  [email or web address].  2. Click on the Sign Up Now box, which takes you to the New Member Sign Up page. You will need to provide the following information:  a. Enter your [employer] Access Code exactly as it appears at the top of this page. (You will not need to use this code after you’ve completed the sign-up process. If you do not sign up before the expiration date, you must request a new code.)   b. Enter your date of birth.   c. Enter your home email address.   d. Click Submit, and follow the next screen’s instructions.  3. Create a Wickr ID. This will be your Wickr login ID and cannot be changed, so think of one that is secure and easy to remember.  4. Create a Wickr password. You can change your password at any time.  5. Enter your Password Reset Question and Answer. This can be used at a later time if you  [Home] : at home, [unfilled] , at the time of the visit. forget your password.   6. Enter your e-mail address. This allows you to receive e-mail notifications when new information is available in [employer].  7. Click Sign Up. You can now view your health information.    For assistance activating your [employer] account, call [phone]         [Medical Office: ([facility])___] : at the medical office located in  [Verbal consent obtained from patient] : the patient, [unfilled] [Follow - Up] : a follow-up visit [Hypothyroidism] : hypothyroidism

## 2021-11-29 ENCOUNTER — TELEPHONE (OUTPATIENT)
Dept: MEDICAL GROUP | Facility: MEDICAL CENTER | Age: 31
End: 2021-11-29

## 2021-11-29 DIAGNOSIS — G47.9 SLEEP DISORDER: ICD-10-CM

## 2021-11-29 DIAGNOSIS — E78.5 HYPERLIPIDEMIA LDL GOAL <130: ICD-10-CM

## 2021-11-29 DIAGNOSIS — G43.C1 INTRACTABLE PERIODIC HEADACHE SYNDROME: ICD-10-CM

## 2021-11-29 RX ORDER — TRAZODONE HYDROCHLORIDE 100 MG/1
TABLET ORAL
Qty: 90 TABLET | Refills: 0 | Status: SHIPPED | OUTPATIENT
Start: 2021-11-29 | End: 2022-01-25 | Stop reason: SDUPTHER

## 2021-12-16 ENCOUNTER — HOSPITAL ENCOUNTER (OUTPATIENT)
Dept: LAB | Facility: MEDICAL CENTER | Age: 31
End: 2021-12-16
Attending: NURSE PRACTITIONER
Payer: COMMERCIAL

## 2021-12-16 DIAGNOSIS — E78.5 HYPERLIPIDEMIA LDL GOAL <100: ICD-10-CM

## 2021-12-16 DIAGNOSIS — Z13.0 SCREENING, ANEMIA, DEFICIENCY, IRON: ICD-10-CM

## 2021-12-16 DIAGNOSIS — Z13.29 SCREENING FOR THYROID DISORDER: ICD-10-CM

## 2021-12-16 DIAGNOSIS — Z13.21 ENCOUNTER FOR VITAMIN DEFICIENCY SCREENING: ICD-10-CM

## 2021-12-16 DIAGNOSIS — R53.83 FATIGUE, UNSPECIFIED TYPE: ICD-10-CM

## 2021-12-16 DIAGNOSIS — Z13.89 SCREENING FOR MULTIPLE CONDITIONS: ICD-10-CM

## 2021-12-16 LAB
ALBUMIN SERPL BCP-MCNC: 4.8 G/DL (ref 3.2–4.9)
ALBUMIN/GLOB SERPL: 1.9 G/DL
ALP SERPL-CCNC: 114 U/L (ref 30–99)
ALT SERPL-CCNC: 42 U/L (ref 2–50)
ANION GAP SERPL CALC-SCNC: 12 MMOL/L (ref 7–16)
AST SERPL-CCNC: 41 U/L (ref 12–45)
BASOPHILS # BLD AUTO: 1.6 % (ref 0–1.8)
BASOPHILS # BLD: 0.07 K/UL (ref 0–0.12)
BILIRUB SERPL-MCNC: 0.5 MG/DL (ref 0.1–1.5)
BUN SERPL-MCNC: 8 MG/DL (ref 8–22)
CALCIUM SERPL-MCNC: 9.4 MG/DL (ref 8.5–10.5)
CHLORIDE SERPL-SCNC: 103 MMOL/L (ref 96–112)
CHOLEST SERPL-MCNC: 235 MG/DL (ref 100–199)
CO2 SERPL-SCNC: 24 MMOL/L (ref 20–33)
CREAT SERPL-MCNC: 0.82 MG/DL (ref 0.5–1.4)
EOSINOPHIL # BLD AUTO: 0.1 K/UL (ref 0–0.51)
EOSINOPHIL NFR BLD: 2.3 % (ref 0–6.9)
ERYTHROCYTE [DISTWIDTH] IN BLOOD BY AUTOMATED COUNT: 42.6 FL (ref 35.9–50)
FOLATE SERPL-MCNC: 25.8 NG/ML
GLOBULIN SER CALC-MCNC: 2.5 G/DL (ref 1.9–3.5)
GLUCOSE SERPL-MCNC: 87 MG/DL (ref 65–99)
HCT VFR BLD AUTO: 47.6 % (ref 42–52)
HDLC SERPL-MCNC: 64 MG/DL
HGB BLD-MCNC: 16.4 G/DL (ref 14–18)
IMM GRANULOCYTES # BLD AUTO: 0.02 K/UL (ref 0–0.11)
IMM GRANULOCYTES NFR BLD AUTO: 0.5 % (ref 0–0.9)
LDLC SERPL CALC-MCNC: 149 MG/DL
LYMPHOCYTES # BLD AUTO: 1.61 K/UL (ref 1–4.8)
LYMPHOCYTES NFR BLD: 37.2 % (ref 22–41)
MCH RBC QN AUTO: 31.7 PG (ref 27–33)
MCHC RBC AUTO-ENTMCNC: 34.5 G/DL (ref 33.7–35.3)
MCV RBC AUTO: 92.1 FL (ref 81.4–97.8)
MONOCYTES # BLD AUTO: 0.44 K/UL (ref 0–0.85)
MONOCYTES NFR BLD AUTO: 10.2 % (ref 0–13.4)
NEUTROPHILS # BLD AUTO: 2.09 K/UL (ref 1.82–7.42)
NEUTROPHILS NFR BLD: 48.2 % (ref 44–72)
NRBC # BLD AUTO: 0 K/UL
NRBC BLD-RTO: 0 /100 WBC
PLATELET # BLD AUTO: 271 K/UL (ref 164–446)
PMV BLD AUTO: 10.8 FL (ref 9–12.9)
POTASSIUM SERPL-SCNC: 3.9 MMOL/L (ref 3.6–5.5)
PROT SERPL-MCNC: 7.3 G/DL (ref 6–8.2)
RBC # BLD AUTO: 5.17 M/UL (ref 4.7–6.1)
SODIUM SERPL-SCNC: 139 MMOL/L (ref 135–145)
T3FREE SERPL-MCNC: 3.54 PG/ML (ref 2–4.4)
T4 FREE SERPL-MCNC: 1.27 NG/DL (ref 0.93–1.7)
THYROPEROXIDASE AB SERPL-ACNC: <9 IU/ML (ref 0–9)
TRIGL SERPL-MCNC: 110 MG/DL (ref 0–149)
TSH SERPL DL<=0.005 MIU/L-ACNC: 0.75 UIU/ML (ref 0.38–5.33)
VIT B12 SERPL-MCNC: 1316 PG/ML (ref 211–911)
WBC # BLD AUTO: 4.3 K/UL (ref 4.8–10.8)

## 2021-12-16 PROCEDURE — 82746 ASSAY OF FOLIC ACID SERUM: CPT

## 2021-12-16 PROCEDURE — 80053 COMPREHEN METABOLIC PANEL: CPT

## 2021-12-16 PROCEDURE — 36415 COLL VENOUS BLD VENIPUNCTURE: CPT

## 2021-12-16 PROCEDURE — 82306 VITAMIN D 25 HYDROXY: CPT

## 2021-12-16 PROCEDURE — 86376 MICROSOMAL ANTIBODY EACH: CPT

## 2021-12-16 PROCEDURE — 84439 ASSAY OF FREE THYROXINE: CPT

## 2021-12-16 PROCEDURE — 84443 ASSAY THYROID STIM HORMONE: CPT

## 2021-12-16 PROCEDURE — 84270 ASSAY OF SEX HORMONE GLOBUL: CPT

## 2021-12-16 PROCEDURE — 80061 LIPID PANEL: CPT

## 2021-12-16 PROCEDURE — 82607 VITAMIN B-12: CPT

## 2021-12-16 PROCEDURE — 84403 ASSAY OF TOTAL TESTOSTERONE: CPT

## 2021-12-16 PROCEDURE — 84402 ASSAY OF FREE TESTOSTERONE: CPT

## 2021-12-16 PROCEDURE — 83003 ASSAY GROWTH HORMONE (HGH): CPT

## 2021-12-16 PROCEDURE — 84481 FREE ASSAY (FT-3): CPT

## 2021-12-16 PROCEDURE — 85025 COMPLETE CBC W/AUTO DIFF WBC: CPT

## 2021-12-19 LAB
25(OH)D3 SERPL-MCNC: 38 NG/ML (ref 30–80)
GHRH SERPL-MCNC: 0.06 NG/ML (ref 0.05–3)

## 2021-12-20 LAB
SHBG SERPL-SCNC: 49 NMOL/L (ref 11–80)
TESTOST FREE MFR SERPL: 1.6 % (ref 1.6–2.9)
TESTOST FREE SERPL-MCNC: 127 PG/ML (ref 47–244)
TESTOST SERPL-MCNC: 784 NG/DL (ref 300–1080)

## 2021-12-21 ENCOUNTER — OFFICE VISIT (OUTPATIENT)
Dept: MEDICAL GROUP | Facility: MEDICAL CENTER | Age: 31
End: 2021-12-21
Payer: COMMERCIAL

## 2021-12-21 VITALS
SYSTOLIC BLOOD PRESSURE: 130 MMHG | HEART RATE: 67 BPM | OXYGEN SATURATION: 98 % | HEIGHT: 72 IN | TEMPERATURE: 97.6 F | DIASTOLIC BLOOD PRESSURE: 82 MMHG | WEIGHT: 192 LBS | BODY MASS INDEX: 26.01 KG/M2

## 2021-12-21 DIAGNOSIS — R79.89 BLOOD TESTOSTERONE INCREASED COMPARED WITH PRIOR MEASUREMENT: ICD-10-CM

## 2021-12-21 DIAGNOSIS — E55.9 VITAMIN D DEFICIENCY: ICD-10-CM

## 2021-12-21 DIAGNOSIS — E78.5 HYPERLIPIDEMIA LDL GOAL <130: ICD-10-CM

## 2021-12-21 DIAGNOSIS — R79.89 ELEVATED LFTS: ICD-10-CM

## 2021-12-21 DIAGNOSIS — Z00.00 ANNUAL PHYSICAL EXAM: ICD-10-CM

## 2021-12-21 DIAGNOSIS — S22.000S THORACIC COMPRESSION FRACTURE, SEQUELA: ICD-10-CM

## 2021-12-21 DIAGNOSIS — R59.0 CERVICAL LYMPHADENOPATHY: ICD-10-CM

## 2021-12-21 PROCEDURE — 99395 PREV VISIT EST AGE 18-39: CPT | Performed by: NURSE PRACTITIONER

## 2021-12-21 ASSESSMENT — FIBROSIS 4 INDEX: FIB4 SCORE: 0.68

## 2021-12-21 NOTE — PROGRESS NOTES
Subjective     Tavares Knowles is a 31 y.o. male who presents with PE          HPI  Seen in f/u for PE.    He is feeling well.  Over the last several weeks his rt neck lymph node will swell up and cause irritation/pain.  This am he ate oatmeal and the node swelled up for about 1 hr.  He has checked 2 COVID tests and they were negative.  No current illness.  No fever, chills or sweating.    He still has left arm tingling with his recurrent thoracic fractures.  He had appt for khyphoplasty last yr but had to cancel.  Still getting the numbness and tingling.  He has seen [facility] and they didn't help. In 2019 he had 2 thoracic compression fx.  Then 2020 he had 2 more fx.  Ortho has thought due to doing heavy lifting workout.  Reviewed lab with pt.  GFR, folate, B12, TPO, T4, TSH, T3 is wnl.  HGH and testosterone are both wnl but signficantly changed from lab done 4 mo ago.  Pt reports no change in meds.  Dec activity over last 2 wks.  HDL down from 2.7 to 0.06; testosterone up from 398 to 784.  He is not on testosterone replacement.   No change in otc supplements.  Eats a very strict healthy diet.  Until 2 wks ago is exercising regularly with both cardio and lifting wts.    VITAMIN D is wnl but down from 45 to 38.  He is not on otc supplement.  LP shows trg and HDL are at goal.  LDL is 149.  No signfiviant change from last check.  He is not on statin.  Follows a very healthy lifestyle.  CMP is wnl except alk phos is newly elevated.  His SGOT AND SGPT is wnl but up from 29/25 to 41/42.  This is in the setting of newly elevated alk phos.  He drinks etoh rarely and doesn't use tyl.  CBC wnl except overall wbc sl low at 4.3.  Dif is wnl.        Patient Active Problem List    Diagnosis Date Noted   • History of vertebral compression fracture 08/11/2021   • Migraine without aura 08/11/2021   • Hyperlipidemia LDL goal <100 12/15/2020   • Atopic dermatitis, unspecified 07/25/2019   • Seizure (HCC) 04/23/2019   •  Insomnia 04/18/2019   • Epistaxis, recurrent 04/18/2019   • Chronic midline thoracic back pain 07/17/2017     Current Outpatient Medications   Medication Sig Dispense Refill   • traZODone (DESYREL) 100 MG Tab Take 1-1.5 tablets PO qhs. 90 Tablet 0   • levETIRAcetam (KEPPRA) 500 MG Tab Take 1 Tablet by mouth 2 times a day. 180 Tablet 1   • tacrolimus (PROTOPIC) 0.1 % Ointment Apply 1 Application topically 2 times a day. 30 g 0   • eletriptan (RELPAX) 40 MG tablet Take 1/2-1 tablet po at onset of headache, may repeat dose in 2 hours if unrelieved.  Do not exceed more than 2 tablets in 24 hours. 9 Tablet 2     No current facility-administered medications for this visit.     Patient has no known allergies.    ROS  Review of Systems   Constitutional: Negative.  Negative for fever, chills, weight loss, malaise/fatigue and diaphoresis.   HENT: Negative.  Negative for hearing loss, ear pain, nosebleeds, congestion, sore throat, neck pain, tinnitus and ear discharge.    Eyes: Negative.  Negative for blurred vision, double vision, photophobia, pain, discharge and redness.   Respiratory: Negative.  Negative for cough, hemoptysis, sputum production, shortness of breath, wheezing and stridor.    Cardiovascular: Negative.  Negative for chest pain, palpitations, orthopnea, claudication, leg swelling and PND.   Gastrointestinal: Negative.  Negative for heartburn, nausea, vomiting, abdominal pain, diarrhea, constipation, blood in stool and melena.   Genitourinary: Negative.  Negative for dysuria, urgency, frequency, incontinence, hematuria and flank pain.   Musculoskeletal: Negative.  Negative for myalgias, joint pain and falls.   Skin: Negative.  Negative for itching and rash.   Neurological: Negative.  Negative for dizziness, tremors, sensory change, speech change, focal weakness, seizures, loss of consciousness, weakness and headaches.   Endo/Heme/Allergies: Negative.  Negative for environmental allergies and polydipsia. Does not  bruise/bleed easily.   Psychiatric/Behavioral: Negative.  Negative for depression, suicidal ideas, hallucinations, memory loss and substance abuse. The patient is not nervous/anxious and does have insomnia.    All other systems reviewed and are negative.      Objective     /82 (BP Location: Right arm, Patient Position: Sitting)   Pulse 67   Temp 36.4 °C (97.6 °F) (Temporal)   Ht 1.829 m (6')   Wt 87.1 kg (192 lb)   SpO2 98%   BMI 26.04 kg/m²      Physical Exam      Physical Exam   Vitals reviewed.  Constitutional: oriented to person, place, and time. appears well-developed and well-nourished. No distress.   HENT: Head: Normocephalic and atraumatic. Bilateral tympanic membranes wnl w/o bulging.  Right Ear: External ear normal. Left Ear: External ear normal. Nose: Nose normal.  Mouth/Throat: Oropharynx is clear and moist. No oropharyngeal exudate. cara tm wnl. Eyes: Conjunctivae and EOM are normal. Pupils are equal, round, and reactive to light. Right eye exhibits no discharge. Left eye exhibits no discharge. No scleral icterus.    Neck: Normal range of motion. Neck supple. No JVD present.   Cardiovascular: Normal rate, regular rhythm, normal heart sounds and intact distal pulses.  Exam reveals no gallop and no friction rub.  No murmur heard.  No carotid bruits   Pulmonary/Chest: Effort normal and breath sounds normal. No stridor. No respiratory distress. no wheezes or rales. exhibits no tenderness.   Abdominal: Soft. Bowel sounds are normal. exhibits no distension and no mass. No tenderness. no rebound and no guarding.   Musculoskeletal: Normal range of motion. exhibits no edema or tenderness.  cara pedal pulses 2+.  Lymphadenopathy:  no cervical or supraclavicular adenopathy.   Neurological: alert and oriented to person, place, and time. has normal reflexes. displays normal reflexes. No cranial nerve deficit. exhibits normal muscle tone. Coordination normal.   Skin: Skin is warm and dry. No rash noted. no  diaphoresis. No erythema. No pallor.   Psychiatric: normal mood and affect. behavior is normal.     Assessment & Plan         1. Annual physical exam     2. Cervical lymphadenopathy  US-SOFT TISSUES OF HEAD - NECK    do neck us to evaluate lymph node.  f/u w/pt w/result   3. Hyperlipidemia LDL goal <130      LDL elevated despite very healthy lifestyle.  plan:  continue healthy diet and regular exercise. plan: recheck lab 6 mo.  call for lab slip.  f/u for review.   4. Thoracic compression fracture, sequela  Referral to Neurosurgery    DS-BONE DENSITY STUDY (DEXA)    refer neurosurgery for eval d/t multiple recurrent thoracic compression fx and left arm tingling   5. Elevated LFTs  HEPATIC FUNCTION PANEL    ALKALINE PHOSPHATASE ISOENZYMES    SGOT & SGPT up from normal to high normal.  alk phos newly elevated.  chk akl phos isoenzymes and repeat HFP.  consider RUQ us if still abn   6. Blood testosterone increased compared with prior measurement      will plan to monitor in 6 mo with f/u lab.  ? etiology.  no sx   7. Vitamin D deficiency      vitamin d down from 45 to 38.  start otc supplement 2000 units daily   8.  F/u w/pt w/lab results in 1 mo  9.  Plan:  reheck lab 6 mo.  Call for lab slip

## 2022-01-11 ENCOUNTER — HOSPITAL ENCOUNTER (OUTPATIENT)
Dept: RADIOLOGY | Facility: MEDICAL CENTER | Age: 32
End: 2022-01-11
Attending: NURSE PRACTITIONER
Payer: COMMERCIAL

## 2022-01-11 DIAGNOSIS — R59.0 CERVICAL LYMPHADENOPATHY: ICD-10-CM

## 2022-01-11 DIAGNOSIS — S22.000S THORACIC COMPRESSION FRACTURE, SEQUELA: ICD-10-CM

## 2022-01-11 PROCEDURE — 76536 US EXAM OF HEAD AND NECK: CPT

## 2022-01-11 PROCEDURE — 77080 DXA BONE DENSITY AXIAL: CPT

## 2022-01-12 ENCOUNTER — TELEPHONE (OUTPATIENT)
Dept: MEDICAL GROUP | Facility: MEDICAL CENTER | Age: 32
End: 2022-01-12

## 2022-01-12 NOTE — TELEPHONE ENCOUNTER
Please let pt know that the dexa scan to check for osteoporosis and the neck us to check for enlarged lymph nodes is wnl.

## 2022-06-30 DIAGNOSIS — Z79.899 HIGH RISK MEDICATION USE: ICD-10-CM

## 2022-06-30 DIAGNOSIS — R56.9 SEIZURE (HCC): ICD-10-CM

## 2022-06-30 DIAGNOSIS — E78.5 HYPERLIPIDEMIA LDL GOAL <130: ICD-10-CM

## 2022-06-30 NOTE — LETTER
July 7, 2022        Tavares Knowles  [address] [address]        Dear Tavares:    Our records indicate that you are due for your next  in-clinic visit. Please give us a call at [phone] and our scheduling team will assist you with scheduling this appointment. Be sure to complete your labs prior to this appointment!      If you have any questions or concerns, please don't hesitate to call.        Sincerely,        RANDI Hoang.    Electronically Signed

## 2022-07-06 RX ORDER — LEVETIRACETAM 500 MG/1
TABLET ORAL
Qty: 60 TABLET | Refills: 0 | Status: SHIPPED | OUTPATIENT
Start: 2022-07-06 | End: 2022-08-07

## 2022-11-10 DIAGNOSIS — Z13.21 ENCOUNTER FOR VITAMIN DEFICIENCY SCREENING: ICD-10-CM

## 2022-11-10 DIAGNOSIS — R79.89 ELEVATED LFTS: ICD-10-CM

## 2022-11-10 DIAGNOSIS — E78.5 HYPERLIPIDEMIA LDL GOAL <130: ICD-10-CM

## 2022-11-10 DIAGNOSIS — Z13.29 SCREENING FOR THYROID DISORDER: ICD-10-CM

## 2022-11-10 DIAGNOSIS — Z13.89 SCREENING FOR MULTIPLE CONDITIONS: ICD-10-CM

## 2022-11-10 DIAGNOSIS — E55.9 VITAMIN D DEFICIENCY: ICD-10-CM

## 2022-11-10 DIAGNOSIS — E78.5 HYPERLIPIDEMIA LDL GOAL <100: ICD-10-CM

## 2022-11-10 DIAGNOSIS — Z79.899 HIGH RISK MEDICATION USE: ICD-10-CM

## 2022-11-10 DIAGNOSIS — Z13.0 SCREENING, ANEMIA, DEFICIENCY, IRON: ICD-10-CM

## 2022-11-10 DIAGNOSIS — R53.83 FATIGUE, UNSPECIFIED TYPE: ICD-10-CM

## 2022-12-02 ENCOUNTER — HOSPITAL ENCOUNTER (OUTPATIENT)
Dept: LAB | Facility: MEDICAL CENTER | Age: 32
End: 2022-12-02
Attending: NURSE PRACTITIONER
Payer: COMMERCIAL

## 2022-12-02 DIAGNOSIS — R79.89 ELEVATED LFTS: ICD-10-CM

## 2022-12-02 DIAGNOSIS — R53.83 FATIGUE, UNSPECIFIED TYPE: ICD-10-CM

## 2022-12-02 DIAGNOSIS — Z13.21 ENCOUNTER FOR VITAMIN DEFICIENCY SCREENING: ICD-10-CM

## 2022-12-02 DIAGNOSIS — E78.5 HYPERLIPIDEMIA LDL GOAL <100: ICD-10-CM

## 2022-12-02 DIAGNOSIS — Z13.0 SCREENING, ANEMIA, DEFICIENCY, IRON: ICD-10-CM

## 2022-12-02 DIAGNOSIS — Z13.89 SCREENING FOR MULTIPLE CONDITIONS: ICD-10-CM

## 2022-12-02 DIAGNOSIS — Z13.29 SCREENING FOR THYROID DISORDER: ICD-10-CM

## 2022-12-02 LAB
ALBUMIN SERPL BCP-MCNC: 4.9 G/DL (ref 3.2–4.9)
ALBUMIN/GLOB SERPL: 2 G/DL
ALP SERPL-CCNC: 78 U/L (ref 30–99)
ALT SERPL-CCNC: 28 U/L (ref 2–50)
ANION GAP SERPL CALC-SCNC: 12 MMOL/L (ref 7–16)
AST SERPL-CCNC: 26 U/L (ref 12–45)
BASOPHILS # BLD AUTO: 0.9 % (ref 0–1.8)
BASOPHILS # BLD: 0.06 K/UL (ref 0–0.12)
BILIRUB SERPL-MCNC: 0.7 MG/DL (ref 0.1–1.5)
BUN SERPL-MCNC: 10 MG/DL (ref 8–22)
CALCIUM SERPL-MCNC: 10.1 MG/DL (ref 8.5–10.5)
CHLORIDE SERPL-SCNC: 103 MMOL/L (ref 96–112)
CHOLEST SERPL-MCNC: 227 MG/DL (ref 100–199)
CO2 SERPL-SCNC: 25 MMOL/L (ref 20–33)
CREAT SERPL-MCNC: 0.75 MG/DL (ref 0.5–1.4)
EOSINOPHIL # BLD AUTO: 0.15 K/UL (ref 0–0.51)
EOSINOPHIL NFR BLD: 2.2 % (ref 0–6.9)
ERYTHROCYTE [DISTWIDTH] IN BLOOD BY AUTOMATED COUNT: 40.9 FL (ref 35.9–50)
GFR SERPLBLD CREATININE-BSD FMLA CKD-EPI: 124 ML/MIN/1.73 M 2
GLOBULIN SER CALC-MCNC: 2.5 G/DL (ref 1.9–3.5)
GLUCOSE SERPL-MCNC: 82 MG/DL (ref 65–99)
HCT VFR BLD AUTO: 49.6 % (ref 42–52)
HDLC SERPL-MCNC: 79 MG/DL
HGB BLD-MCNC: 17.2 G/DL (ref 14–18)
IMM GRANULOCYTES # BLD AUTO: 0.02 K/UL (ref 0–0.11)
IMM GRANULOCYTES NFR BLD AUTO: 0.3 % (ref 0–0.9)
LDLC SERPL CALC-MCNC: 134 MG/DL
LYMPHOCYTES # BLD AUTO: 1.52 K/UL (ref 1–4.8)
LYMPHOCYTES NFR BLD: 22 % (ref 22–41)
MCH RBC QN AUTO: 31.9 PG (ref 27–33)
MCHC RBC AUTO-ENTMCNC: 34.7 G/DL (ref 33.7–35.3)
MCV RBC AUTO: 92 FL (ref 81.4–97.8)
MONOCYTES # BLD AUTO: 0.57 K/UL (ref 0–0.85)
MONOCYTES NFR BLD AUTO: 8.3 % (ref 0–13.4)
NEUTROPHILS # BLD AUTO: 4.58 K/UL (ref 1.82–7.42)
NEUTROPHILS NFR BLD: 66.3 % (ref 44–72)
NRBC # BLD AUTO: 0 K/UL
NRBC BLD-RTO: 0 /100 WBC
PLATELET # BLD AUTO: 247 K/UL (ref 164–446)
PMV BLD AUTO: 11.5 FL (ref 9–12.9)
POTASSIUM SERPL-SCNC: 4 MMOL/L (ref 3.6–5.5)
PROT SERPL-MCNC: 7.4 G/DL (ref 6–8.2)
RBC # BLD AUTO: 5.39 M/UL (ref 4.7–6.1)
SODIUM SERPL-SCNC: 140 MMOL/L (ref 135–145)
TRIGL SERPL-MCNC: 72 MG/DL (ref 0–149)
WBC # BLD AUTO: 6.9 K/UL (ref 4.8–10.8)

## 2022-12-02 PROCEDURE — 36415 COLL VENOUS BLD VENIPUNCTURE: CPT

## 2022-12-02 PROCEDURE — 82746 ASSAY OF FOLIC ACID SERUM: CPT

## 2022-12-02 PROCEDURE — 84270 ASSAY OF SEX HORMONE GLOBUL: CPT

## 2022-12-02 PROCEDURE — 82306 VITAMIN D 25 HYDROXY: CPT

## 2022-12-02 PROCEDURE — 82607 VITAMIN B-12: CPT

## 2022-12-02 PROCEDURE — 84443 ASSAY THYROID STIM HORMONE: CPT

## 2022-12-02 PROCEDURE — 84481 FREE ASSAY (FT-3): CPT

## 2022-12-02 PROCEDURE — 85025 COMPLETE CBC W/AUTO DIFF WBC: CPT

## 2022-12-02 PROCEDURE — 86376 MICROSOMAL ANTIBODY EACH: CPT

## 2022-12-02 PROCEDURE — 80053 COMPREHEN METABOLIC PANEL: CPT

## 2022-12-02 PROCEDURE — 84403 ASSAY OF TOTAL TESTOSTERONE: CPT

## 2022-12-02 PROCEDURE — 84402 ASSAY OF FREE TESTOSTERONE: CPT

## 2022-12-02 PROCEDURE — 83003 ASSAY GROWTH HORMONE (HGH): CPT

## 2022-12-02 PROCEDURE — 80061 LIPID PANEL: CPT

## 2022-12-02 PROCEDURE — 84439 ASSAY OF FREE THYROXINE: CPT

## 2022-12-03 LAB
25(OH)D3 SERPL-MCNC: 43 NG/ML (ref 30–100)
FOLATE SERPL-MCNC: 23.6 NG/ML
T3FREE SERPL-MCNC: 3.08 PG/ML (ref 2–4.4)
T4 FREE SERPL-MCNC: 1.37 NG/DL (ref 0.93–1.7)
THYROPEROXIDASE AB SERPL-ACNC: <9 IU/ML (ref 0–9)
TSH SERPL DL<=0.005 MIU/L-ACNC: 0.89 UIU/ML (ref 0.38–5.33)
VIT B12 SERPL-MCNC: 1138 PG/ML (ref 211–911)

## 2022-12-05 LAB
SHBG SERPL-SCNC: 45 NMOL/L (ref 17–56)
TESTOST FREE MFR SERPL: 1.6 % (ref 1.6–2.9)
TESTOST FREE SERPL-MCNC: 100 PG/ML (ref 47–244)
TESTOST SERPL-MCNC: 614 NG/DL (ref 300–1080)

## 2022-12-06 ENCOUNTER — TELEPHONE (OUTPATIENT)
Dept: MEDICAL GROUP | Facility: MEDICAL CENTER | Age: 32
End: 2022-12-06
Payer: COMMERCIAL

## 2022-12-06 LAB — GHRH SERPL-MCNC: 3.22 NG/ML (ref 0.05–3)

## 2022-12-06 NOTE — TELEPHONE ENCOUNTER
----- Message from ESTEBAN Hoang sent at 12/6/2022 12:05 PM PST -----  Please have pt set appointment to review and discuss treatment for labs.

## 2022-12-06 NOTE — LETTER
December 6, 2022        Tavares Knowles  [address] [address]        Sharri James has received your most recent lab work and would like you to make an appointment to be seen either in office or virtually to go over your labs results. Please call [phone] to schedule an appointment or you may schedule via My chart.         Thank you,                                 Aydee Batista, Med Ass't

## 2022-12-08 ENCOUNTER — OFFICE VISIT (OUTPATIENT)
Dept: MEDICAL GROUP | Facility: MEDICAL CENTER | Age: 32
End: 2022-12-08
Payer: COMMERCIAL

## 2022-12-08 VITALS
WEIGHT: 187 LBS | OXYGEN SATURATION: 96 % | BODY MASS INDEX: 25.33 KG/M2 | TEMPERATURE: 97.6 F | SYSTOLIC BLOOD PRESSURE: 120 MMHG | HEART RATE: 67 BPM | DIASTOLIC BLOOD PRESSURE: 80 MMHG | RESPIRATION RATE: 16 BRPM | HEIGHT: 72 IN

## 2022-12-08 DIAGNOSIS — R56.9 SEIZURE (HCC): ICD-10-CM

## 2022-12-08 DIAGNOSIS — F51.01 PRIMARY INSOMNIA: ICD-10-CM

## 2022-12-08 DIAGNOSIS — Z00.00 ANNUAL PHYSICAL EXAM: ICD-10-CM

## 2022-12-08 DIAGNOSIS — G43.009 MIGRAINE WITHOUT AURA AND WITHOUT STATUS MIGRAINOSUS, NOT INTRACTABLE: ICD-10-CM

## 2022-12-08 DIAGNOSIS — Z02.89 ENCOUNTER FOR COMPLETION OF FORM WITH PATIENT: ICD-10-CM

## 2022-12-08 DIAGNOSIS — E78.5 HYPERLIPIDEMIA LDL GOAL <130: ICD-10-CM

## 2022-12-08 LAB
HBA1C MFR BLD: 5.1 % (ref 0–5.6)
INT CON NEG: NEGATIVE
INT CON POS: POSITIVE

## 2022-12-08 PROCEDURE — 99395 PREV VISIT EST AGE 18-39: CPT | Performed by: PHYSICIAN ASSISTANT

## 2022-12-08 PROCEDURE — 83036 HEMOGLOBIN GLYCOSYLATED A1C: CPT | Performed by: PHYSICIAN ASSISTANT

## 2022-12-08 ASSESSMENT — FIBROSIS 4 INDEX: FIB4 SCORE: 0.6

## 2022-12-08 NOTE — PROGRESS NOTES
Subjective:     Chief Complaint   Patient presents with    Annual Exam     Tavares Knowles is a 32 y.o. male here today to follow to Discuss:    HPI:    Patient is a pleasant 32-year-old  male who comes in for annual examination.  Brings his form for insurance because he needed filled out.  There history of seizure disorder for which he takes Keppra.  Well-controlled patient also has a history of migraines for which he takes Relpax and this helps them.  Takes.  Trazodone for insomnia    Current medicines (including changes today)  Current Outpatient Medications   Medication Sig Dispense Refill    traZODone (DESYREL) 100 MG Tab TAKE 1 TABLET TO 1 AND 1/2 TABLETS AT BEDTIME 30 Tablet 0    levETIRAcetam (KEPPRA) 500 MG Tab TAKE 1 TABLET TWICE A  Tablet 0    tacrolimus (PROTOPIC) 0.1 % Ointment Apply 1 Application topically 2 times a day. 30 g 0    eletriptan (RELPAX) 40 MG tablet Take 1/2-1 tablet po at onset of headache, may repeat dose in 2 hours if unrelieved.  Do not exceed more than 2 tablets in 24 hours. 9 Tablet 2     No current facility-administered medications for this visit.     He  has a past medical history of Atopic dermatitis, unspecified (7/25/2019), Chronic midline thoracic back pain (7/17/2017), Epistaxis, recurrent (4/18/2019), History of vertebral compression fracture (8/11/2021), Hyperlipidemia LDL goal <100 (12/15/2020), Insomnia (4/18/2019), Migraine without aura (8/11/2021), and Seizure (HCC) (4/23/2019).    ROS  As per listed in the HPI, otherwise negative     Objective:     /80   Pulse 67   Temp 36.4 °C (97.6 °F) (Temporal)   Resp 16   Ht 1.829 m (6')   Wt 84.8 kg (187 lb)   SpO2 96%  Body mass index is 25.36 kg/m².     Physical Exam:  General: Patient appears well-nourished, well-hydrated, nontoxic  HEENT, normocephalic atraumatic, PERRLA, extraocular movements intact, nares are patent and clear  Neck: No visible masses, thyromegaly or abnormalities  noted  Cardiovascular.  Sitting comfortably without visible signs of edema  Lungs: No cyanosis noted, nondyspneic  Skin: Well perfused without evidence of rash or lesions  Neurological: Cranial nerves II through XII intact, normal gait  Musculoskeletal: Normal range of motion, normal strength and no deficit noted       Assessment and Plan:   The following treatment plan was discussed and signs and symptoms for which to return were discussed with patient.  Patient verbalized understanding.    1. Hyperlipidemia LDL goal <130  Chronic  Patient's LDL is mildly elevated.  Overall cholesterol panel is good with low triglycerides and high HDL.  Continue to focus on plant-based diet such as    2. Seizure (HCC)  Chronic condition  Continue Keppra as prescribed  3. Primary insomnia  Chronic and stable  Continue trazodone     4. Annual physical exam  Anticipatory guidance discussed at length    5. Encounter for completion of form with patient  Did fill out physical exam form for his insurance with values including height, weight, lipid panel results etc. they see scanned section for further information    Of note hemoglobin A1c value was requested.  We did that in clinic and it was 5.1%    Followup: No follow-ups on file.         Please note that this dictation was created using voice recognition software. I have made every reasonable attempt to correct obvious errors, but I expect that there are errors of grammar and possibly content that I did not discover before finalizing the note.

## 2022-12-19 DIAGNOSIS — G47.9 SLEEP DISORDER: ICD-10-CM

## 2022-12-19 DIAGNOSIS — R56.9 SEIZURE (HCC): ICD-10-CM

## 2022-12-19 DIAGNOSIS — G43.C1 INTRACTABLE PERIODIC HEADACHE SYNDROME: ICD-10-CM

## 2022-12-19 RX ORDER — TRAZODONE HYDROCHLORIDE 100 MG/1
TABLET ORAL
Qty: 30 TABLET | Refills: 0 | Status: SHIPPED | OUTPATIENT
Start: 2022-12-19 | End: 2023-03-23 | Stop reason: SDUPTHER

## 2022-12-19 RX ORDER — LEVETIRACETAM 500 MG/1
500 TABLET ORAL 2 TIMES DAILY
Qty: 180 TABLET | Refills: 0 | Status: SHIPPED | OUTPATIENT
Start: 2022-12-19 | End: 2023-03-23 | Stop reason: SDUPTHER

## 2023-03-23 ENCOUNTER — TELEMEDICINE (OUTPATIENT)
Dept: MEDICAL GROUP | Facility: MEDICAL CENTER | Age: 33
End: 2023-03-23
Payer: COMMERCIAL

## 2023-03-23 ENCOUNTER — TELEPHONE (OUTPATIENT)
Dept: MEDICAL GROUP | Facility: MEDICAL CENTER | Age: 33
End: 2023-03-23

## 2023-03-23 VITALS — BODY MASS INDEX: 25.48 KG/M2 | HEART RATE: 52 BPM | WEIGHT: 182 LBS | HEIGHT: 71 IN

## 2023-03-23 DIAGNOSIS — S22.000S THORACIC COMPRESSION FRACTURE, SEQUELA: ICD-10-CM

## 2023-03-23 DIAGNOSIS — F51.01 PRIMARY INSOMNIA: ICD-10-CM

## 2023-03-23 DIAGNOSIS — G89.29 CHRONIC BILATERAL THORACIC BACK PAIN: ICD-10-CM

## 2023-03-23 DIAGNOSIS — R56.9 SEIZURE (HCC): ICD-10-CM

## 2023-03-23 DIAGNOSIS — E78.5 HYPERLIPIDEMIA LDL GOAL <130: ICD-10-CM

## 2023-03-23 DIAGNOSIS — S22.009D CLOSED FRACTURE OF MULTIPLE THORACIC VERTEBRAE WITH ROUTINE HEALING, SUBSEQUENT ENCOUNTER: ICD-10-CM

## 2023-03-23 DIAGNOSIS — R20.0 ARM NUMBNESS LEFT: ICD-10-CM

## 2023-03-23 DIAGNOSIS — Z79.899 HIGH RISK MEDICATION USE: ICD-10-CM

## 2023-03-23 DIAGNOSIS — E22.0 GROWTH HORMONE EXCESS (HCC): ICD-10-CM

## 2023-03-23 DIAGNOSIS — M54.6 CHRONIC BILATERAL THORACIC BACK PAIN: ICD-10-CM

## 2023-03-23 PROCEDURE — 99214 OFFICE O/P EST MOD 30 MIN: CPT | Mod: 95 | Performed by: NURSE PRACTITIONER

## 2023-03-23 RX ORDER — LEVETIRACETAM 500 MG/1
500 TABLET ORAL 2 TIMES DAILY
Qty: 180 TABLET | Refills: 0 | Status: SHIPPED | OUTPATIENT
Start: 2023-03-23 | End: 2023-06-08

## 2023-03-23 RX ORDER — TRAZODONE HYDROCHLORIDE 100 MG/1
TABLET ORAL
Qty: 135 TABLET | Refills: 0 | Status: SHIPPED | OUTPATIENT
Start: 2023-03-23

## 2023-03-23 ASSESSMENT — FIBROSIS 4 INDEX: FIB4 SCORE: 0.6

## 2023-03-23 ASSESSMENT — PATIENT HEALTH QUESTIONNAIRE - PHQ9: CLINICAL INTERPRETATION OF PHQ2 SCORE: 0

## 2023-03-23 NOTE — PROGRESS NOTES
Virtual Visit: Established Patient   This visit was conducted via Zoom using secure and encrypted videoconferencing technology.   The patient was in their home in the [facility].    The patient's identity was confirmed and verbal consent was obtained for this virtual visit.    Subjective:   CC:   Chief Complaint   Patient presents with    Medication Refill    Referral Needed     [facility]     Tavares Toni Knowles is a 32 y.o. male presenting for evaluation and management of:  He moved to [place] last month.  He needs to have his meds refilled.   He is on trazodone for sleep.  He had decreased the med to 1/2 tab at hs.  Then he moved and now not sleeping well.  He has been using sleep hygiene techniques.  Last nite he didn't fall asleep to about 2 am.  He feels that it is r/t stress.  He would like to go back on 1.5 tabs at nite prn.  He has tried melatonin but that didn't help.   He has been working out with running and weights.  He is doing that multiple times a week.  He has tried to improve his diet.  His last LP was improved but LDL still not at goal.  Reviewed lab with pt that was done in december. A1c, GFR, CMP, CBC, testosterone, folate, B12, TSH, T4, T3, TPO, B12, VITAMIN D IS WNL.  LP shows trg down from 110 to 72.  HDL up from 64 to 79.  LDL down from 149 to 134.  Goal is <130.  Not on statin.  Growth hormone is elevated.  He is not on testosterone or growth hormone.  He had a accident with fx 6 years ago.  He has done PT, massages but still having pain.  He will having more pain with some exercises.   He has seen [facility] for injections but that didn't help.  He is having left arm just below elbow and ring and little fingers will go numb.  The numbness is random.  Will occur with rest and activities.  The forearm numbness is on the bottom of his arm just below elbow.  He has more pain with lying down at nite also      ROS   Review of Systems   Constitutional: Negative.   "Negative for fever, chills, weight loss, malaise/fatigue and diaphoresis.   HENT: Negative.  Negative for hearing loss, ear pain, nosebleeds, congestion, sore throat, neck pain, tinnitus and ear discharge.    Respiratory: Negative.  Negative for cough, hemoptysis, sputum production, shortness of breath, wheezing and stridor.    Cardiovascular: Negative.  Negative for chest pain, palpitations, orthopnea, claudication, leg swelling and PND.   Gastrointestinal: denies nausea, vomiting, diarrhea, constipation, heartburn, melena or hematochezia.  Genitourinary: Denies dysuria, hematuria, urinary incontinence, frequency or urgency.    Musculoskeletal: Negative.  Negative for myalgias.   Neurological: Negative.  Negative for dizziness, tremors, weakness and headaches.   Psych:  Denies depression, anxiety or insomnia.  All other systems reviewed and are negative.      Current medicines (including changes today)  Current Outpatient Medications   Medication Sig Dispense Refill    traZODone (DESYREL) 100 MG Tab TAKE 1 TABLET TO 1 AND 1/2 TABLETS AT BEDTIME 135 Tablet 0    levETIRAcetam (KEPPRA) 500 MG Tab Take 1 Tablet by mouth 2 times a day. 180 Tablet 0    tacrolimus (PROTOPIC) 0.1 % Ointment Apply 1 Application topically 2 times a day. 30 g 0    eletriptan (RELPAX) 40 MG tablet Take 1/2-1 tablet po at onset of headache, may repeat dose in 2 hours if unrelieved.  Do not exceed more than 2 tablets in 24 hours. 9 Tablet 2     No current facility-administered medications for this visit.       Patient Active Problem List    Diagnosis Date Noted    History of vertebral compression fracture 08/11/2021    Migraine without aura 08/11/2021    Hyperlipidemia LDL goal <100 12/15/2020    Atopic dermatitis, unspecified 07/25/2019    Seizure (HCC) 04/23/2019    Insomnia 04/18/2019    Epistaxis, recurrent 04/18/2019    Chronic midline thoracic back pain 07/17/2017        Objective:   Pulse (!) 52   Ht 1.803 m (5' 11\")   Wt 82.6 kg (182 " lb)   BMI 25.38 kg/m²     Physical Exam:  Constitutional: Alert, no distress, well-groomed.  Skin: No rashes in visible areas.  Eye: Round. Conjunctiva clear, lids normal. No icterus.   ENMT: Lips pink without lesions, good dentition, moist mucous membranes. Phonation normal.  Neck: No masses, no thyromegaly. Moves freely without pain.  Respiratory: Unlabored respiratory effort, no cough or audible wheeze  Psych: Alert and oriented x3, normal affect and mood.     Assessment and Plan:   The following treatment plan was discussed:   1. Hyperlipidemia LDL goal <130  CMP14+LP    last lab w/improved trg and HDL.  LDL almost at goal. continue healthy diet & regular exercise. indu lab 6/23. f/u for review.       2. Primary insomnia  traZODone (DESYREL) 100 MG Tab    refill and restart trazodone for prn use.      3. Growth hormone excess (HCC)  GROWTH HORMONE    elevated in dec. indu in june. f/u for review.       4. Thoracic compression fracture, sequela  Referral to Neurosurgery    hx multiple compression fx.  continued pain and numbness. refer neurosurgery per pt request      5. Seizure (HCC)  levETIRAcetam (KEPPRA) 500 MG Tab    followed by neuro.  stable on keppra.  no recent seizures      6. Closed fracture of multiple thoracic vertebrae with routine healing, subsequent encounter  Referral to Neurosurgery      7. High risk medication use  levETIRAcetam (KEPPRA) 500 MG Tab    CBC WITH DIFFERENTIAL    indu CBC d/t keppra in june      8. Arm numbness left  Referral to Neurosurgery    continue to have sx from thoracic spine fx.  refer neuro surgery      9. Chronic bilateral thoracic back pain  Referral to Neurosurgery              Follow-up: Return in about 3 months (around 6/23/2023), or for lab review.

## 2023-04-27 ENCOUNTER — HOSPITAL ENCOUNTER (OUTPATIENT)
Dept: RADIOLOGY | Facility: MEDICAL CENTER | Age: 33
End: 2023-04-27
Attending: PHYSICAL MEDICINE & REHABILITATION
Payer: COMMERCIAL

## 2023-04-27 DIAGNOSIS — M54.6 PAIN IN THORACIC SPINE: ICD-10-CM

## 2023-04-27 DIAGNOSIS — M54.2 CERVICALGIA: ICD-10-CM

## 2023-04-27 PROCEDURE — 72070 X-RAY EXAM THORAC SPINE 2VWS: CPT

## 2023-04-27 PROCEDURE — 72050 X-RAY EXAM NECK SPINE 4/5VWS: CPT

## 2023-04-27 PROCEDURE — 72141 MRI NECK SPINE W/O DYE: CPT

## 2023-04-27 PROCEDURE — 72146 MRI CHEST SPINE W/O DYE: CPT

## 2023-06-06 ENCOUNTER — HOSPITAL ENCOUNTER (OUTPATIENT)
Dept: LAB | Facility: MEDICAL CENTER | Age: 33
End: 2023-06-06
Attending: NURSE PRACTITIONER
Payer: COMMERCIAL

## 2023-06-06 DIAGNOSIS — Z79.899 HIGH RISK MEDICATION USE: ICD-10-CM

## 2023-06-06 DIAGNOSIS — E22.0 GROWTH HORMONE EXCESS (HCC): ICD-10-CM

## 2023-06-06 LAB
ALBUMIN SERPL BCP-MCNC: 4.6 G/DL (ref 3.2–4.9)
ALBUMIN/GLOB SERPL: 1.8 G/DL
ALP SERPL-CCNC: 67 U/L (ref 30–99)
ALT SERPL-CCNC: 37 U/L (ref 2–50)
ANION GAP SERPL CALC-SCNC: 10 MMOL/L (ref 7–16)
AST SERPL-CCNC: 24 U/L (ref 12–45)
BASOPHILS # BLD AUTO: 0.9 % (ref 0–1.8)
BASOPHILS # BLD: 0.05 K/UL (ref 0–0.12)
BILIRUB SERPL-MCNC: 0.6 MG/DL (ref 0.1–1.5)
BUN SERPL-MCNC: 7 MG/DL (ref 8–22)
CALCIUM ALBUM COR SERPL-MCNC: 8.9 MG/DL (ref 8.5–10.5)
CALCIUM SERPL-MCNC: 9.4 MG/DL (ref 8.4–10.2)
CHLORIDE SERPL-SCNC: 102 MMOL/L (ref 96–112)
CHOLEST SERPL-MCNC: 157 MG/DL (ref 100–199)
CO2 SERPL-SCNC: 24 MMOL/L (ref 20–33)
CREAT SERPL-MCNC: 0.8 MG/DL (ref 0.5–1.4)
EOSINOPHIL # BLD AUTO: 0.11 K/UL (ref 0–0.51)
EOSINOPHIL NFR BLD: 1.9 % (ref 0–6.9)
ERYTHROCYTE [DISTWIDTH] IN BLOOD BY AUTOMATED COUNT: 37.6 FL (ref 35.9–50)
FASTING STATUS PATIENT QL REPORTED: NORMAL
GFR SERPLBLD CREATININE-BSD FMLA CKD-EPI: 121 ML/MIN/1.73 M 2
GLOBULIN SER CALC-MCNC: 2.5 G/DL (ref 1.9–3.5)
GLUCOSE SERPL-MCNC: 93 MG/DL (ref 65–99)
HCT VFR BLD AUTO: 47.8 % (ref 42–52)
HDLC SERPL-MCNC: 65 MG/DL
HGB BLD-MCNC: 16.7 G/DL (ref 14–18)
IMM GRANULOCYTES # BLD AUTO: 0.01 K/UL (ref 0–0.11)
IMM GRANULOCYTES NFR BLD AUTO: 0.2 % (ref 0–0.9)
LDLC SERPL CALC-MCNC: 80 MG/DL
LYMPHOCYTES # BLD AUTO: 1.75 K/UL (ref 1–4.8)
LYMPHOCYTES NFR BLD: 30.5 % (ref 22–41)
MCH RBC QN AUTO: 31.3 PG (ref 27–33)
MCHC RBC AUTO-ENTMCNC: 34.9 G/DL (ref 32.3–36.5)
MCV RBC AUTO: 89.5 FL (ref 81.4–97.8)
MONOCYTES # BLD AUTO: 0.55 K/UL (ref 0–0.85)
MONOCYTES NFR BLD AUTO: 9.6 % (ref 0–13.4)
NEUTROPHILS # BLD AUTO: 3.26 K/UL (ref 1.82–7.42)
NEUTROPHILS NFR BLD: 56.9 % (ref 44–72)
NRBC # BLD AUTO: 0 K/UL
NRBC BLD-RTO: 0 /100 WBC (ref 0–0.2)
PLATELET # BLD AUTO: 280 K/UL (ref 164–446)
PMV BLD AUTO: 10.6 FL (ref 9–12.9)
POTASSIUM SERPL-SCNC: 3.8 MMOL/L (ref 3.6–5.5)
PROT SERPL-MCNC: 7.1 G/DL (ref 6–8.2)
RBC # BLD AUTO: 5.34 M/UL (ref 4.7–6.1)
SODIUM SERPL-SCNC: 136 MMOL/L (ref 135–145)
TRIGL SERPL-MCNC: 58 MG/DL (ref 0–149)
WBC # BLD AUTO: 5.7 K/UL (ref 4.8–10.8)

## 2023-06-06 PROCEDURE — 85025 COMPLETE CBC W/AUTO DIFF WBC: CPT

## 2023-06-06 PROCEDURE — 80053 COMPREHEN METABOLIC PANEL: CPT

## 2023-06-06 PROCEDURE — 36415 COLL VENOUS BLD VENIPUNCTURE: CPT

## 2023-06-06 PROCEDURE — 83003 ASSAY GROWTH HORMONE (HGH): CPT

## 2023-06-06 PROCEDURE — 80061 LIPID PANEL: CPT

## 2023-06-08 DIAGNOSIS — R56.9 SEIZURE (HCC): ICD-10-CM

## 2023-06-08 DIAGNOSIS — Z79.899 HIGH RISK MEDICATION USE: ICD-10-CM

## 2023-06-08 RX ORDER — LEVETIRACETAM 500 MG/1
TABLET ORAL
Qty: 180 TABLET | Refills: 0 | Status: SHIPPED | OUTPATIENT
Start: 2023-06-08 | End: 2023-09-06

## 2023-06-08 NOTE — TELEPHONE ENCOUNTER
Received request via: Pharmacy    Was the patient seen in the last year in this department? Yes    Does the patient have an active prescription (recently filled or refills available) for medication(s) requested?  yes    Does the patient have alf Plus and need 100 day supply (blood pressure, diabetes and cholesterol meds only)? Patient does not have SCP  Requested Prescriptions     Pending Prescriptions Disp Refills    levETIRAcetam (KEPPRA) 500 MG Tab [Pharmacy Med Name: LEVETIRACETA TAB 500MG] 180 Tablet 0     Sig: TAKE 1 TABLET TWICE A DAY   '

## 2023-06-09 LAB — GHRH SERPL-MCNC: <0.05 NG/ML (ref 0.05–3)

## 2023-06-21 ENCOUNTER — OFFICE VISIT (OUTPATIENT)
Dept: MEDICAL GROUP | Facility: MEDICAL CENTER | Age: 33
End: 2023-06-21
Payer: COMMERCIAL

## 2023-06-21 VITALS
BODY MASS INDEX: 26.32 KG/M2 | TEMPERATURE: 97.9 F | RESPIRATION RATE: 17 BRPM | HEART RATE: 75 BPM | HEIGHT: 71 IN | DIASTOLIC BLOOD PRESSURE: 70 MMHG | SYSTOLIC BLOOD PRESSURE: 110 MMHG | OXYGEN SATURATION: 100 % | WEIGHT: 188 LBS

## 2023-06-21 DIAGNOSIS — F33.1 MODERATE EPISODE OF RECURRENT MAJOR DEPRESSIVE DISORDER (HCC): ICD-10-CM

## 2023-06-21 DIAGNOSIS — R20.0 ARM NUMBNESS LEFT: ICD-10-CM

## 2023-06-21 PROCEDURE — 99214 OFFICE O/P EST MOD 30 MIN: CPT | Performed by: NURSE PRACTITIONER

## 2023-06-21 PROCEDURE — 3078F DIAST BP <80 MM HG: CPT | Performed by: NURSE PRACTITIONER

## 2023-06-21 PROCEDURE — 3074F SYST BP LT 130 MM HG: CPT | Performed by: NURSE PRACTITIONER

## 2023-06-21 ASSESSMENT — FIBROSIS 4 INDEX: FIB4 SCORE: 0.42

## 2023-06-21 NOTE — PROGRESS NOTES
Subjective:     Tavares Knowles is a 32 y.o. male who presents with depression.    HPI:   Seen in f/u for depression.  He has been keppra and trazodone for awhile.    He has noted over the last several weeks thta hehas wosening depression. He is on keppra for hx of seizured and trazodone for sleep.  He feels that he has moderate sx.  No SI/HI.  He doesn't feel happy with happy things.    He is having more cervical neck pain.  He is now followed by neuro. He was started on neurontin and mobic for his upper spine pain.  He was told that his spine is wnl but he has nerve damage to UE.     Patient Active Problem List    Diagnosis Date Noted    History of vertebral compression fracture 08/11/2021    Migraine without aura 08/11/2021    Hyperlipidemia LDL goal <100 12/15/2020    Atopic dermatitis, unspecified 07/25/2019    Seizure (HCC) 04/23/2019    Insomnia 04/18/2019    Epistaxis, recurrent 04/18/2019    Chronic midline thoracic back pain 07/17/2017       Current medicines (including changes today)  Current Outpatient Medications   Medication Sig Dispense Refill    levETIRAcetam (KEPPRA) 500 MG Tab TAKE 1 TABLET TWICE A  Tablet 0    traZODone (DESYREL) 100 MG Tab TAKE 1 TABLET TO 1 AND 1/2 TABLETS AT BEDTIME 135 Tablet 0     No current facility-administered medications for this visit.       No Known Allergies    ROS  Constitutional: Negative. Negative for fever, chills, weight loss, malaise/fatigue and diaphoresis.   HENT: Negative. Negative for hearing loss, ear pain, nosebleeds, congestion, sore throat, neck pain, tinnitus and ear discharge.   Respiratory: Negative. Negative for cough, hemoptysis, sputum production, shortness of breath, wheezing and stridor.   Cardiovascular: Negative. Negative for chest pain, palpitations, orthopnea, claudication, leg swelling and PND.   Gastrointestinal: Denies nausea, vomiting, diarrhea, constipation, heartburn, melena or hematochezia.  Genitourinary: Denies  "dysuria, hematuria, urinary incontinence, frequency or urgency.        Objective:     /70 (BP Location: Right arm, Patient Position: Sitting, BP Cuff Size: Adult)   Pulse 75   Temp 36.6 °C (97.9 °F) (Temporal)   Resp 17   Ht 1.803 m (5' 11\")   Wt 85.3 kg (188 lb)   SpO2 100%  Body mass index is 26.22 kg/m².    Physical Exam:  Vitals reviewed.  Constitutional: Oriented to person, place, and time. appears well-developed and well-nourished. No distress.   Cardiovascular: Normal rate, regular rhythm, normal heart sounds and intact distal pulses. Exam reveals no gallop and no friction rub. No murmur heard. No carotid bruits.   Pulmonary/Chest: Effort normal and breath sounds normal. No stridor. No respiratory distress. no wheezes or rales. exhibits no tenderness.   Musculoskeletal: Normal range of motion. exhibits no edema. cara pedal pulses 2+.  Lymphadenopathy: No cervical or supraclavicular adenopathy.   Neurological: Alert and oriented to person, place, and time. exhibits normal muscle tone.  Skin: Skin is warm and dry. No diaphoresis.   Psychiatric: Normal mood and affect. Behavior is normal.      Assessment and Plan:     The following treatment plan was discussed:    1. Moderate episode of recurrent major depressive disorder (HCC)      wean off trazodone if needed: 1/2 tab hs x 1-2 wks the 1/2 everyother day x 1-2 wks then 1/2 every 3rd day x 1-2 wks.  may continue if racing thought continue      2. Arm numbness left      start mobic in am & neurotin at HS.  f/u with neuro as sched      3.  Start sertraline/zoloft in am after 1-2 wks on neurontin.        Followup: Return in about 4 weeks (around 7/19/2023), or f/u after on zoloft x 1 mo.  "

## 2023-07-03 DIAGNOSIS — F33.1 MODERATE EPISODE OF RECURRENT MAJOR DEPRESSIVE DISORDER (HCC): ICD-10-CM

## 2023-09-03 DIAGNOSIS — Z79.899 HIGH RISK MEDICATION USE: ICD-10-CM

## 2023-09-03 DIAGNOSIS — R56.9 SEIZURE (HCC): ICD-10-CM

## 2023-09-05 NOTE — TELEPHONE ENCOUNTER
Received request via: Pharmacy    Was the patient seen in the last year in this department? Yes    Does the patient have an active prescription (recently filled or refills available) for medication(s) requested? yes    Does the patient have correction Plus and need 100 day supply (blood pressure, diabetes and cholesterol meds only)? Patient does not have SCP   Requested Prescriptions     Pending Prescriptions Disp Refills    levETIRAcetam (KEPPRA) 500 MG Tab [Pharmacy Med Name: LEVETIRACETA TAB 500MG] 180 Tablet 0     Sig: TAKE 1 TABLET TWICE A DAY

## 2023-09-06 RX ORDER — LEVETIRACETAM 500 MG/1
TABLET ORAL
Qty: 180 TABLET | Refills: 0 | Status: SHIPPED | OUTPATIENT
Start: 2023-09-06 | End: 2023-12-02

## 2023-12-01 DIAGNOSIS — R56.9 SEIZURE (HCC): ICD-10-CM

## 2023-12-01 DIAGNOSIS — Z79.899 HIGH RISK MEDICATION USE: ICD-10-CM

## 2023-12-02 RX ORDER — LEVETIRACETAM 500 MG/1
TABLET ORAL
Qty: 180 TABLET | Refills: 2 | Status: SHIPPED | OUTPATIENT
Start: 2023-12-02

## 2025-04-08 DIAGNOSIS — E23.0 GROWTH HORMONE DEFICIENCY (HCC): ICD-10-CM

## 2025-04-08 DIAGNOSIS — Z13.89 SCREENING FOR MULTIPLE CONDITIONS: ICD-10-CM

## 2025-04-08 DIAGNOSIS — E78.5 HYPERLIPIDEMIA LDL GOAL <100: ICD-10-CM

## 2025-04-08 DIAGNOSIS — Z79.899 HIGH RISK MEDICATION USE: ICD-10-CM

## 2025-04-08 DIAGNOSIS — Z13.21 ENCOUNTER FOR VITAMIN DEFICIENCY SCREENING: ICD-10-CM

## 2025-04-16 ENCOUNTER — HOSPITAL ENCOUNTER (OUTPATIENT)
Dept: LAB | Facility: MEDICAL CENTER | Age: 35
End: 2025-04-16
Attending: NURSE PRACTITIONER
Payer: COMMERCIAL

## 2025-04-16 DIAGNOSIS — Z13.21 ENCOUNTER FOR VITAMIN DEFICIENCY SCREENING: ICD-10-CM

## 2025-04-16 DIAGNOSIS — E78.5 HYPERLIPIDEMIA LDL GOAL <100: ICD-10-CM

## 2025-04-16 DIAGNOSIS — E23.0 GROWTH HORMONE DEFICIENCY (HCC): ICD-10-CM

## 2025-04-16 DIAGNOSIS — Z79.899 HIGH RISK MEDICATION USE: ICD-10-CM

## 2025-04-16 DIAGNOSIS — Z13.89 SCREENING FOR MULTIPLE CONDITIONS: ICD-10-CM

## 2025-04-16 LAB
25(OH)D3 SERPL-MCNC: 64 NG/ML (ref 30–100)
ALBUMIN SERPL BCP-MCNC: 4.4 G/DL (ref 3.2–4.9)
ALBUMIN/GLOB SERPL: 1.5 G/DL
ALP SERPL-CCNC: 78 U/L (ref 30–99)
ALT SERPL-CCNC: 26 U/L (ref 2–50)
ANION GAP SERPL CALC-SCNC: 9 MMOL/L (ref 7–16)
AST SERPL-CCNC: 23 U/L (ref 12–45)
BASOPHILS # BLD AUTO: 1 % (ref 0–1.8)
BASOPHILS # BLD: 0.05 K/UL (ref 0–0.12)
BILIRUB SERPL-MCNC: 0.4 MG/DL (ref 0.1–1.5)
BUN SERPL-MCNC: 15 MG/DL (ref 8–22)
CALCIUM ALBUM COR SERPL-MCNC: 9.5 MG/DL (ref 8.5–10.5)
CALCIUM SERPL-MCNC: 9.8 MG/DL (ref 8.5–10.5)
CHLORIDE SERPL-SCNC: 105 MMOL/L (ref 96–112)
CHOLEST SERPL-MCNC: 258 MG/DL (ref 100–199)
CO2 SERPL-SCNC: 25 MMOL/L (ref 20–33)
CREAT SERPL-MCNC: 1.04 MG/DL (ref 0.5–1.4)
EOSINOPHIL # BLD AUTO: 0.19 K/UL (ref 0–0.51)
EOSINOPHIL NFR BLD: 3.8 % (ref 0–6.9)
ERYTHROCYTE [DISTWIDTH] IN BLOOD BY AUTOMATED COUNT: 42.1 FL (ref 35.9–50)
FASTING STATUS PATIENT QL REPORTED: NORMAL
GFR SERPLBLD CREATININE-BSD FMLA CKD-EPI: 97 ML/MIN/1.73 M 2
GLOBULIN SER CALC-MCNC: 2.9 G/DL (ref 1.9–3.5)
GLUCOSE SERPL-MCNC: 91 MG/DL (ref 65–99)
HCT VFR BLD AUTO: 50.5 % (ref 42–52)
HDLC SERPL-MCNC: 60 MG/DL
HGB BLD-MCNC: 17 G/DL (ref 14–18)
IMM GRANULOCYTES # BLD AUTO: 0.01 K/UL (ref 0–0.11)
IMM GRANULOCYTES NFR BLD AUTO: 0.2 % (ref 0–0.9)
LDLC SERPL CALC-MCNC: 176 MG/DL
LYMPHOCYTES # BLD AUTO: 2.01 K/UL (ref 1–4.8)
LYMPHOCYTES NFR BLD: 40 % (ref 22–41)
MCH RBC QN AUTO: 31.1 PG (ref 27–33)
MCHC RBC AUTO-ENTMCNC: 33.7 G/DL (ref 32.3–36.5)
MCV RBC AUTO: 92.5 FL (ref 81.4–97.8)
MONOCYTES # BLD AUTO: 0.46 K/UL (ref 0–0.85)
MONOCYTES NFR BLD AUTO: 9.1 % (ref 0–13.4)
NEUTROPHILS # BLD AUTO: 2.31 K/UL (ref 1.82–7.42)
NEUTROPHILS NFR BLD: 45.9 % (ref 44–72)
NRBC # BLD AUTO: 0 K/UL
NRBC BLD-RTO: 0 /100 WBC (ref 0–0.2)
PLATELET # BLD AUTO: 255 K/UL (ref 164–446)
PMV BLD AUTO: 11.7 FL (ref 9–12.9)
POTASSIUM SERPL-SCNC: 4.7 MMOL/L (ref 3.6–5.5)
PROT SERPL-MCNC: 7.3 G/DL (ref 6–8.2)
RBC # BLD AUTO: 5.46 M/UL (ref 4.7–6.1)
SODIUM SERPL-SCNC: 139 MMOL/L (ref 135–145)
TRIGL SERPL-MCNC: 110 MG/DL (ref 0–149)
WBC # BLD AUTO: 5 K/UL (ref 4.8–10.8)

## 2025-04-16 PROCEDURE — 36415 COLL VENOUS BLD VENIPUNCTURE: CPT

## 2025-04-16 PROCEDURE — 80053 COMPREHEN METABOLIC PANEL: CPT

## 2025-04-16 PROCEDURE — 83003 ASSAY GROWTH HORMONE (HGH): CPT

## 2025-04-16 PROCEDURE — 80061 LIPID PANEL: CPT

## 2025-04-16 PROCEDURE — 85025 COMPLETE CBC W/AUTO DIFF WBC: CPT

## 2025-04-16 PROCEDURE — 82306 VITAMIN D 25 HYDROXY: CPT

## 2025-04-19 LAB — GHRH SERPL-MCNC: <0.05 NG/ML (ref 0.05–3)

## 2025-04-20 ENCOUNTER — RESULTS FOLLOW-UP (OUTPATIENT)
Dept: MEDICAL GROUP | Facility: MEDICAL CENTER | Age: 35
End: 2025-04-20

## 2025-04-20 SDOH — HEALTH STABILITY: PHYSICAL HEALTH: ON AVERAGE, HOW MANY MINUTES DO YOU ENGAGE IN EXERCISE AT THIS LEVEL?: 60 MIN

## 2025-04-20 SDOH — ECONOMIC STABILITY: FOOD INSECURITY: WITHIN THE PAST 12 MONTHS, THE FOOD YOU BOUGHT JUST DIDN'T LAST AND YOU DIDN'T HAVE MONEY TO GET MORE.: NEVER TRUE

## 2025-04-20 SDOH — ECONOMIC STABILITY: TRANSPORTATION INSECURITY
IN THE PAST 12 MONTHS, HAS LACK OF TRANSPORTATION KEPT YOU FROM MEETINGS, WORK, OR FROM GETTING THINGS NEEDED FOR DAILY LIVING?: NO

## 2025-04-20 SDOH — ECONOMIC STABILITY: INCOME INSECURITY: IN THE LAST 12 MONTHS, WAS THERE A TIME WHEN YOU WERE NOT ABLE TO PAY THE MORTGAGE OR RENT ON TIME?: NO

## 2025-04-20 SDOH — HEALTH STABILITY: PHYSICAL HEALTH: ON AVERAGE, HOW MANY DAYS PER WEEK DO YOU ENGAGE IN MODERATE TO STRENUOUS EXERCISE (LIKE A BRISK WALK)?: 5 DAYS

## 2025-04-20 SDOH — ECONOMIC STABILITY: TRANSPORTATION INSECURITY
IN THE PAST 12 MONTHS, HAS THE LACK OF TRANSPORTATION KEPT YOU FROM MEDICAL APPOINTMENTS OR FROM GETTING MEDICATIONS?: NO

## 2025-04-20 SDOH — ECONOMIC STABILITY: FOOD INSECURITY: WITHIN THE PAST 12 MONTHS, YOU WORRIED THAT YOUR FOOD WOULD RUN OUT BEFORE YOU GOT MONEY TO BUY MORE.: NEVER TRUE

## 2025-04-20 SDOH — ECONOMIC STABILITY: INCOME INSECURITY: HOW HARD IS IT FOR YOU TO PAY FOR THE VERY BASICS LIKE FOOD, HOUSING, MEDICAL CARE, AND HEATING?: NOT VERY HARD

## 2025-04-20 SDOH — ECONOMIC STABILITY: TRANSPORTATION INSECURITY
IN THE PAST 12 MONTHS, HAS LACK OF RELIABLE TRANSPORTATION KEPT YOU FROM MEDICAL APPOINTMENTS, MEETINGS, WORK OR FROM GETTING THINGS NEEDED FOR DAILY LIVING?: NO

## 2025-04-20 SDOH — ECONOMIC STABILITY: HOUSING INSECURITY
IN THE LAST 12 MONTHS, WAS THERE A TIME WHEN YOU DID NOT HAVE A STEADY PLACE TO SLEEP OR SLEPT IN A SHELTER (INCLUDING NOW)?: NO

## 2025-04-20 SDOH — HEALTH STABILITY: MENTAL HEALTH
STRESS IS WHEN SOMEONE FEELS TENSE, NERVOUS, ANXIOUS, OR CAN'T SLEEP AT NIGHT BECAUSE THEIR MIND IS TROUBLED. HOW STRESSED ARE YOU?: RATHER MUCH

## 2025-04-20 ASSESSMENT — SOCIAL DETERMINANTS OF HEALTH (SDOH)
HOW OFTEN DO YOU HAVE SIX OR MORE DRINKS ON ONE OCCASION: NEVER
WITHIN THE PAST 12 MONTHS, YOU WORRIED THAT YOUR FOOD WOULD RUN OUT BEFORE YOU GOT THE MONEY TO BUY MORE: NEVER TRUE
HOW OFTEN DO YOU GET TOGETHER WITH FRIENDS OR RELATIVES?: ONCE A WEEK
HOW MANY DRINKS CONTAINING ALCOHOL DO YOU HAVE ON A TYPICAL DAY WHEN YOU ARE DRINKING: 3 OR 4
HOW OFTEN DO YOU ATTENT MEETINGS OF THE CLUB OR ORGANIZATION YOU BELONG TO?: MORE THAN 4 TIMES PER YEAR
HOW OFTEN DO YOU HAVE A DRINK CONTAINING ALCOHOL: 2-4 TIMES A MONTH
HOW OFTEN DO YOU ATTENT MEETINGS OF THE CLUB OR ORGANIZATION YOU BELONG TO?: MORE THAN 4 TIMES PER YEAR
HOW OFTEN DO YOU GET TOGETHER WITH FRIENDS OR RELATIVES?: ONCE A WEEK
IN A TYPICAL WEEK, HOW MANY TIMES DO YOU TALK ON THE PHONE WITH FAMILY, FRIENDS, OR NEIGHBORS?: MORE THAN THREE TIMES A WEEK
IN A TYPICAL WEEK, HOW MANY TIMES DO YOU TALK ON THE PHONE WITH FAMILY, FRIENDS, OR NEIGHBORS?: MORE THAN THREE TIMES A WEEK
HOW HARD IS IT FOR YOU TO PAY FOR THE VERY BASICS LIKE FOOD, HOUSING, MEDICAL CARE, AND HEATING?: NOT VERY HARD
HOW OFTEN DO YOU ATTEND CHURCH OR RELIGIOUS SERVICES?: MORE THAN 4 TIMES PER YEAR
HOW OFTEN DO YOU ATTEND CHURCH OR RELIGIOUS SERVICES?: MORE THAN 4 TIMES PER YEAR
DO YOU BELONG TO ANY CLUBS OR ORGANIZATIONS SUCH AS CHURCH GROUPS UNIONS, FRATERNAL OR ATHLETIC GROUPS, OR SCHOOL GROUPS?: YES
DO YOU BELONG TO ANY CLUBS OR ORGANIZATIONS SUCH AS CHURCH GROUPS UNIONS, FRATERNAL OR ATHLETIC GROUPS, OR SCHOOL GROUPS?: YES
IN THE PAST 12 MONTHS, HAS THE ELECTRIC, GAS, OIL, OR WATER COMPANY THREATENED TO SHUT OFF SERVICE IN YOUR HOME?: NO

## 2025-04-20 ASSESSMENT — LIFESTYLE VARIABLES
HOW OFTEN DO YOU HAVE SIX OR MORE DRINKS ON ONE OCCASION: NEVER
HOW MANY STANDARD DRINKS CONTAINING ALCOHOL DO YOU HAVE ON A TYPICAL DAY: 3 OR 4
SKIP TO QUESTIONS 9-10: 0
HOW OFTEN DO YOU HAVE A DRINK CONTAINING ALCOHOL: 2-4 TIMES A MONTH
AUDIT-C TOTAL SCORE: 3

## 2025-04-23 ENCOUNTER — APPOINTMENT (OUTPATIENT)
Dept: MEDICAL GROUP | Facility: MEDICAL CENTER | Age: 35
End: 2025-04-23
Payer: COMMERCIAL

## 2025-04-23 VITALS
HEART RATE: 59 BPM | BODY MASS INDEX: 26.85 KG/M2 | OXYGEN SATURATION: 97 % | WEIGHT: 191.8 LBS | SYSTOLIC BLOOD PRESSURE: 116 MMHG | TEMPERATURE: 97 F | DIASTOLIC BLOOD PRESSURE: 62 MMHG | HEIGHT: 71 IN

## 2025-04-23 DIAGNOSIS — R51.9 FREQUENT HEADACHES: ICD-10-CM

## 2025-04-23 DIAGNOSIS — M54.6 CHRONIC BILATERAL THORACIC BACK PAIN: ICD-10-CM

## 2025-04-23 DIAGNOSIS — R56.9 SEIZURE (HCC): ICD-10-CM

## 2025-04-23 DIAGNOSIS — E78.5 HYPERLIPIDEMIA LDL GOAL <130: ICD-10-CM

## 2025-04-23 DIAGNOSIS — Z11.59 NEED FOR HEPATITIS B SCREENING TEST: ICD-10-CM

## 2025-04-23 DIAGNOSIS — R20.0 BILATERAL ARM NUMBNESS AND TINGLING WHILE SLEEPING: ICD-10-CM

## 2025-04-23 DIAGNOSIS — Z00.00 ANNUAL PHYSICAL EXAM: ICD-10-CM

## 2025-04-23 DIAGNOSIS — R79.89 LOW SERUM INSULIN-LIKE GROWTH FACTOR 1 (IGF-1): ICD-10-CM

## 2025-04-23 DIAGNOSIS — Z79.899 HIGH RISK MEDICATION USE: ICD-10-CM

## 2025-04-23 DIAGNOSIS — F33.1 MODERATE EPISODE OF RECURRENT MAJOR DEPRESSIVE DISORDER (HCC): ICD-10-CM

## 2025-04-23 DIAGNOSIS — G89.29 CHRONIC BILATERAL THORACIC BACK PAIN: ICD-10-CM

## 2025-04-23 DIAGNOSIS — Z12.83 SCREENING FOR MALIGNANT NEOPLASM OF SKIN: ICD-10-CM

## 2025-04-23 DIAGNOSIS — R20.2 BILATERAL ARM NUMBNESS AND TINGLING WHILE SLEEPING: ICD-10-CM

## 2025-04-23 PROCEDURE — 99395 PREV VISIT EST AGE 18-39: CPT | Performed by: NURSE PRACTITIONER

## 2025-04-23 PROCEDURE — 3074F SYST BP LT 130 MM HG: CPT | Performed by: NURSE PRACTITIONER

## 2025-04-23 PROCEDURE — 3078F DIAST BP <80 MM HG: CPT | Performed by: NURSE PRACTITIONER

## 2025-04-23 RX ORDER — LEVETIRACETAM 500 MG/1
500 TABLET ORAL DAILY
Qty: 90 TABLET | Refills: 3 | Status: SHIPPED | OUTPATIENT
Start: 2025-04-23

## 2025-04-23 ASSESSMENT — FIBROSIS 4 INDEX: FIB4 SCORE: 0.57

## 2025-04-23 NOTE — PROGRESS NOTES
Subjective:     History of Present Illness  The patient presents for a follow-up of his physical exam.    The chief complaint is frequent headaches, typically occurring in the afternoon around 3:00 to 4:30 PM, lasting for a couple of hours. The pain is localized to the temples and frontal region. He has not monitored his blood pressure during these episodes. He reports clenching his jaws, particularly at night, and is considering the use of a mouthguard.     He has discontinued the use of sertraline and trazodone. He recalls a previous course of sertraline for a period of 30 to 60 days, which he believes may have been beneficial. He is interested in exploring this treatment option again.    He has been managing his cholesterol levels through dietary modifications and exercise. He reports that his cholesterol levels have returned to an undesirable state. He does not smoke. He has a family history of heart disease, with his mother requiring a pacemaker at the age of 58. His father has a history of high blood pressure and prediabetes. He maintains a healthy diet and has significantly reduced his caffeine intake. He continues to engage in regular physical activity, which he finds beneficial for stress management.    He experiences numbness and tingling from his elbows down to his forearms, which he attributes to a previous back injury. He underwent a neurological evaluation several years ago for these symptoms, which also include numbness in his hands, shins, and feet. He is uncertain if these symptoms are due to circulatory issues or elevated blood pressure or cholesterol levels. He reports that his entire arm or foot can fall asleep quickly, particularly when lying on his side or sitting cross-legged. He has not monitored his blood pressure during these episodes. He reports that his symptoms have worsened since his last EMG, which revealed no abnormalities in his back or spine but did indicate an issue with his  wrist.    He has been taking Keppra once daily for the past two years, which has effectively managed his seizures. He is currently on Keppra, administered once daily, and requires a refill. He is no longer taking sertraline or trazodone.    He has not consulted a dermatologist recently and is seeking a referral. He has a mole on his back that he wishes to have examined. He uses an ointment cream intermittently for spot eczema, which he has had for a long time but it comes in waves. He reports no changes in the size, color, or shape of any moles. He has noticed increased itching in the mid-thoracic region of his back.    He has been experiencing thoracic back pain, which has been less severe recently. He attends deep tissue body therapy sessions every Tuesday morning for 30 minutes, which he finds beneficial.    He has been taking vitamin D supplements once daily and is unsure of the dosage.    SOCIAL HISTORY  - Does not smoke    FAMILY HISTORY  - Mother had heart disease at age 58 and got a pacemaker put in about 2 years ago  - Father has high blood pressure and is prediabetic      Results  - Labs:    - GFR: Normal    - Vitamin D: 64, sl elevated on daily otc supplement.    - CMP: wnl    - Growth Factor: Low at <0.05.  previously low last yr, high year before and normla before that.  + labile    - LP: Triglycerides: Normal    - HDL: 60    - LDL: High, up from 80 to 175.  LDL goal <100.  Not on statin.  Already on healthy diet and regular exercise.    - Complete Blood Count: Normal    Current medicines (including changes today)  Current Outpatient Medications   Medication Sig Dispense Refill    levETIRAcetam (KEPPRA) 500 MG Tab TAKE 1 TABLET TWICE A  Tablet 2     No current facility-administered medications for this visit.     Current Outpatient Medications   Medication Sig Dispense Refill    levETIRAcetam (KEPPRA) 500 MG Tab TAKE 1 TABLET TWICE A  Tablet 2     No current facility-administered  medications for this visit.       He  has a past medical history of Atopic dermatitis, unspecified (07/25/2019), Chronic midline thoracic back pain (07/17/2017), History of vertebral compression fracture (08/11/2021), Hyperlipidemia LDL goal <100 (12/15/2020), Insomnia (04/18/2019), Migraine without aura (08/11/2021), and Seizure ([facility]) (04/23/2019).      Lipid Panel:  Lab Results   Component Value Date/Time    CHOLSTRLTOT 258 (H) 04/16/2025 0738    TRIGLYCERIDE 110 04/16/2025 0738     (H) 04/16/2025 0738       Complete Blood Count:  Lab Results   Component Value Date/Time    WBC 5.0 04/16/2025 0738    RBC 5.46 04/16/2025 0738    HEMOGLOBIN 17.0 04/16/2025 0738    HEMATOCRIT 50.5 04/16/2025 0738    MCV 92.5 04/16/2025 0738    MCH 31.1 04/16/2025 0738    MCHC 33.7 04/16/2025 0738    RDW 42.1 04/16/2025 0738    MPV 11.7 04/16/2025 0738    LYMPHOCYTES 40.00 04/16/2025 0738    LYMPHS 2.01 04/16/2025 0738    MONOCYTES 9.10 04/16/2025 0738    MONOS 0.46 04/16/2025 0738    EOSINOPHILS 3.80 04/16/2025 0738    EOS 0.19 04/16/2025 0738    BASOPHILS 1.00 04/16/2025 0738    BASO 0.05 04/16/2025 0738    NRBC 0.00 04/16/2025 0738       Complete Metabolic Panel:  Lab Results   Component Value Date/Time    SODIUM 139 04/16/2025 07:38 AM    POTASSIUM 4.7 04/16/2025 07:38 AM    CHLORIDE 105 04/16/2025 07:38 AM    CO2 25 04/16/2025 07:38 AM    ANION 9.0 04/16/2025 07:38 AM    GLUCOSE 91 04/16/2025 07:38 AM    BUN 15 04/16/2025 07:38 AM    CREATININE 1.04 04/16/2025 07:38 AM    ASTSGOT 23 04/16/2025 07:38 AM    ALTSGPT 26 04/16/2025 07:38 AM    TBILIRUBIN 0.4 04/16/2025 07:38 AM    ALBUMIN 4.4 04/16/2025 07:38 AM    TOTPROTEIN 7.3 04/16/2025 07:38 AM    GLOBULIN 2.9 04/16/2025 07:38 AM    AGRATIO 1.5 04/16/2025 07:38 AM         Vitamin D:    Lab Results   Component Value Date/Time    25HYDROXY 64 04/16/2025 0738       GFR:    Lab Results   Component Value Date/Time    GFRCKD 97 04/16/2025 0738         ROS   Review of Systems  "  Constitutional: Negative.  Negative for fever, chills, weight loss, malaise/fatigue and diaphoresis.   HENT: Negative.  Negative for hearing loss, ear pain, nosebleeds, congestion, sore throat, neck pain, tinnitus and ear discharge.    Eyes: Negative.  Negative for blurred vision, double vision, photophobia, pain, discharge and redness.   Respiratory: Negative.  Negative for cough, hemoptysis, sputum production, shortness of breath, wheezing and stridor.    Cardiovascular: Negative.  Negative for chest pain, palpitations, orthopnea, claudication, PND. + occas leg swelling   Gastrointestinal: Negative.  Negative for heartburn, nausea, vomiting, abdominal pain, diarrhea, constipation, blood in stool and melena.   Genitourinary: Negative.  Negative for dysuria, urgency, frequency, incontinence, hematuria and flank pain.   Musculoskeletal: Negative.  Negative for myalgias, back pain, joint pain and falls.   Skin: Negative.  Negative for itching and rash.   Neurological: Negative.  Negative for dizziness tremors, speech change, focal weakness, seizures, loss of consciousness, weakness.   Endo/Heme/Allergies: Negative.  Negative for environmental allergies and polydipsia. Does not bruise/bleed easily.   Psychiatric/Behavioral: Negative.  Negative for  suicidal ideas, hallucinations, memory loss and substance abuse.   All other systems reviewed and are negative.       Objective:     /62   Pulse (!) 59   Temp 36.1 °C (97 °F) (Temporal)   Ht 1.803 m (5' 11\")   Wt 87 kg (191 lb 12.8 oz)   SpO2 97%  Body mass index is 26.75 kg/m².   Physical Exam  Neurological: Awake, alert, oriented x4, no focal deficit  Head: Normocephalic, atraumatic  Ears: External ear canals and tympanic membranes intact  Eyes: Pupils equal and round, conjunctivae clear  Nose: Septum midline, nares patent, mucosa normal  Mouth/Throat: Mucous membranes moist, no erythema, no exudate  Neck: Supple, no abnormalities  Respiratory: Clear to " auscultation, no wheezing, rales or rhonchi  Cardiovascular: Regular rate and rhythm, no murmurs, rubs, or gallops  Gastrointestinal: Soft, no tenderness, no distention, no masses  Extremities: No edema, no cyanosis  Musculoskeletal: No joint or muscular abnormalities noted  Skin: No abnormalities, no rashes or lesions  Physical Exam   Vitals reviewed.  Constitutional: oriented to person, place, and time. appears well-developed and well-nourished. No distress.   HENT: Head: Normocephalic and atraumatic. Bilateral tympanic membranes wnl w/o bulging.  Right Ear: External ear normal. Left Ear: External ear normal. Nose: Nose normal.  Mouth/Throat: Oropharynx is clear and moist. No oropharyngeal exudate. cara tm wnl. Eyes: Conjunctivae and EOM are normal. Pupils are equal, round, and reactive to light. Right eye exhibits no discharge. Left eye exhibits no discharge. No scleral icterus.    Neck: Normal range of motion. Neck supple. No JVD present.   Cardiovascular: Normal rate, regular rhythm, normal heart sounds and intact distal pulses.  Exam reveals no gallop and no friction rub.  No murmur heard.  No carotid bruits   Pulmonary/Chest: Effort normal and breath sounds normal. No stridor. No respiratory distress. no wheezes or rales. exhibits no tenderness.   Abdominal: Soft. Bowel sounds are normal. exhibits no distension and no mass. No tenderness. no rebound and no guarding.   Musculoskeletal: Normal range of motion. exhibits no edema or tenderness.  cara pedal pulses 2+.  Lymphadenopathy:  no cervical or supraclavicular adenopathy.   Neurological: alert and oriented to person, place, and time. has normal reflexes. displays normal reflexes. No cranial nerve deficit. exhibits normal muscle tone. Coordination normal.   Skin: Skin is warm and dry. No rash noted. no diaphoresis. No erythema. No pallor.   Psychiatric: normal mood and affect. behavior is normal.       Assessment and Plan:   The following treatment plan was  discussed    Assessment & Plan  1. Hypercholesterolemia.  - His LDL levels are elevated, posing an increased risk for coronary artery disease. The goal is to reduce LDL levels to below 130, with a preference for levels below 100.  - A CT scan for coronary calcium score will be ordered to assess the extent of arterial plaque formation.  - He will attempt to manage his cholesterol levels through dietary modifications and increased physical activity over the next 6 months. If there is no improvement, pharmacological intervention will be considered.  - An extensive cholesterol panel called the lipofit will be ordered in 6 months to evaluate particle sizes and risk factors.    2. Headaches.  - His headaches may be attributed to stress or hypertension.  - A prescription for sertraline 50 mg daily will be provided to manage potential stress-related symptoms.  - Additionally, tizanidine will be prescribed for nightly use over the next few weeks to alleviate muscle tension.  - He is advised to monitor his blood pressure daily and record the readings.    3. Dermatological issues.  - A referral to a dermatologist will be made for a comprehensive skin examination and evaluation of a mole on his back.  - The patient will be screened for skin cancer.    4. Bilateral arm tingling witih chronic thoracic back pain.  - The tingling sensation in his arms may be due to nerve impingement originating from the spine, shoulder, elbow, or wrist.  - An EMG of the upper extremities will be ordered to assess the extent of nerve damage.  - The patient reports worsening symptoms, including whole arm numbness and tingling, particularly in the left arm.    5. Seizure disorder.  - His Keppra prescription will be refilled.  - The patient reports no seizures and is stable on Keppra.    6. Thoracic back pain.  - He reports that deep tissue therapy has been helpful.  - No additional treatment is required at this time.    7. Atopic dermatitis.  - A  referral to dermatology will be made for an overall skin check and to evaluate a mole on his back.  - The patient will also seek a refill for his eczema ointment.    8. Vitamin D supplementation.  - His vitamin D levels are within the normal range.  - He will continue his current regimen of vitamin D supplementation.    9. Health maintenance.  - A hepatitis B antibody test will be conducted to determine his immunity status.  - A testosterone level test will be ordered to monitor his levels.    Follow-up: The patient will follow up in 1 month to review lab results and assess the effectiveness of the prescribed treatments.      ORDERS:  1. Seizure (HCC)      2. High risk medication use      3. 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 Need for hepatitis B screening test      5. Hyperlipidemia LDL goal <130      6. Moderate episode of recurrent major depressive disorder (HCC)      7. Screening for malignant neoplasm of skin      8. Bilateral arm numbness and tingling while sleeping          Please note that this dictation was created using voice recognition software. I have made every reasonable attempt to correct obvious errors, but I expect that there are errors of grammar and possibly content that I did not discover before finalizing the note.      Attestation      Verbal consent was acquired by the patient to use "[employer] ambient listening note generation during this visit Yes

## 2025-04-30 NOTE — Clinical Note
REFERRAL APPROVAL NOTICE         Sent on April 30, 2025                   Tavares Knowles  [address] [facility] [address]                   Dear Mr. Knowles,    After a careful review of the medical information and benefit coverage, [facility] has processed your referral. See below for additional details.    If applicable, you must be actively enrolled with your insurance for coverage of the authorized service. If you have any questions regarding your coverage, please contact your insurance directly.    REFERRAL INFORMATION   Referral #:  [id number]  Referred-To Provider    Referred-By Provider:  Dermatology    [facility]      No address on file  Referring provider phone N/A [address]  [facility] [address]  [phone]    Referral Start Date:  04/23/2025  Referral End Date:   04/23/2026             SCHEDULING  If you do not already have an appointment, please call [phone] to make an appointment.     MORE INFORMATION  If you do not already have a [employer] account, sign up at: [email or web address]  You can access your medical information, make appointments, see lab results, billing information, and more.  If you have questions regarding this referral, please contact  the [facility] Referrals department at:             [phone]. Monday - Friday 8:00AM - 5:00PM.     Sincerely,    [facility]

## 2025-05-12 ENCOUNTER — HOSPITAL ENCOUNTER (OUTPATIENT)
Dept: RADIOLOGY | Facility: MEDICAL CENTER | Age: 35
End: 2025-05-12
Attending: NURSE PRACTITIONER
Payer: COMMERCIAL

## 2025-05-12 DIAGNOSIS — E78.5 HYPERLIPIDEMIA LDL GOAL <130: ICD-10-CM

## 2025-05-12 PROCEDURE — 4410556 CT-CARDIAC SCORING (SELF PAY ONLY)

## 2025-05-14 ENCOUNTER — HOSPITAL ENCOUNTER (OUTPATIENT)
Dept: LAB | Facility: MEDICAL CENTER | Age: 35
End: 2025-05-14
Attending: NURSE PRACTITIONER
Payer: COMMERCIAL

## 2025-05-14 DIAGNOSIS — Z11.59 NEED FOR HEPATITIS B SCREENING TEST: ICD-10-CM

## 2025-05-14 DIAGNOSIS — R79.89 LOW SERUM INSULIN-LIKE GROWTH FACTOR 1 (IGF-1): ICD-10-CM

## 2025-05-14 LAB — HBV SURFACE AB SERPL IA-ACNC: <3.5 MIU/ML (ref 0–10)

## 2025-05-14 PROCEDURE — 84403 ASSAY OF TOTAL TESTOSTERONE: CPT

## 2025-05-14 PROCEDURE — 86706 HEP B SURFACE ANTIBODY: CPT

## 2025-05-14 PROCEDURE — 84270 ASSAY OF SEX HORMONE GLOBUL: CPT

## 2025-05-14 PROCEDURE — 84402 ASSAY OF FREE TESTOSTERONE: CPT

## 2025-05-14 PROCEDURE — 36415 COLL VENOUS BLD VENIPUNCTURE: CPT

## 2025-05-16 LAB
SHBG SERPL-SCNC: 59 NMOL/L (ref 17–56)
TESTOST FREE MFR SERPL: 1.4 % (ref 1.6–2.9)
TESTOST FREE SERPL-MCNC: 96 PG/ML (ref 47–244)
TESTOST SERPL-MCNC: 692 NG/DL (ref 300–1080)

## 2025-05-21 ENCOUNTER — APPOINTMENT (OUTPATIENT)
Dept: MEDICAL GROUP | Facility: MEDICAL CENTER | Age: 35
End: 2025-05-21
Payer: COMMERCIAL

## 2025-05-22 ENCOUNTER — OFFICE VISIT (OUTPATIENT)
Dept: MEDICAL GROUP | Facility: MEDICAL CENTER | Age: 35
End: 2025-05-22
Payer: COMMERCIAL

## 2025-05-22 VITALS
TEMPERATURE: 97 F | OXYGEN SATURATION: 96 % | WEIGHT: 191.8 LBS | HEIGHT: 71 IN | SYSTOLIC BLOOD PRESSURE: 132 MMHG | BODY MASS INDEX: 26.85 KG/M2 | DIASTOLIC BLOOD PRESSURE: 76 MMHG | HEART RATE: 92 BPM

## 2025-05-22 DIAGNOSIS — E78.5 HYPERLIPIDEMIA LDL GOAL <100: ICD-10-CM

## 2025-05-22 DIAGNOSIS — R93.1 AGATSTON CORONARY ARTERY CALCIUM SCORE LESS THAN 100: Primary | ICD-10-CM

## 2025-05-22 PROCEDURE — 3078F DIAST BP <80 MM HG: CPT | Performed by: NURSE PRACTITIONER

## 2025-05-22 PROCEDURE — 3075F SYST BP GE 130 - 139MM HG: CPT | Performed by: NURSE PRACTITIONER

## 2025-05-22 PROCEDURE — 99214 OFFICE O/P EST MOD 30 MIN: CPT | Performed by: NURSE PRACTITIONER

## 2025-05-22 ASSESSMENT — FIBROSIS 4 INDEX: FIB4 SCORE: 0.57

## 2025-05-22 NOTE — PROGRESS NOTES
Subjective:     History of Present Illness  The patient presents for follow-up for hyperlipidemia.    He has been diagnosed with hypercholesterolemia. Dietary modifications have been initiated, including an increased intake of vegetables and a reduction in red meat consumption to once weekly. Protein sources have been diversified to include turkey, chicken, and fish. Additionally, cardiovascular exercises have been incorporated into his routine, supplementing his existing weightlifting regimen. These lifestyle changes are aimed at achieving a significant reduction in cholesterol levels over the next 6 months.    He reports receiving results from his coronary calcium score CT scan and blood testosterone levels. The testosterone levels were normal, but the sex binding globulin was slightly elevated. Free testosterone was slightly low but within normal range. He is not currently on any testosterone replacement therapy.    He was found to be non-immune to hepatitis B and has declined the hepatitis B vaccine series at this time.    Family history includes his father's mother having had a stroke at age 65-70, his father having hypertension and type II diabetes, and his younger brother being on hypertension medication.    FAMILY HISTORY  - Paternal grandmother had a stroke at age 65 or 70  - Father has hypertension and type II diabetes from being overweight  - Hypertension runs in the family, primarily on father's side  - Brother (3 years younger) is overweight and on hypertension medication      Results  - Labs:    - Testosterone levels: Normal    - Sex binding globulin: Slightly high    - Free testosterone: Slightly low    - Hepatitis B immunity: Not present    - LP: trg and HDL at goal. LDL high at 178 with goal <100     - Imaging:    - CT for coronary calcium score: Score of 50      Current medicines (including changes today)  Current Medications[1]  Current Medications[2]    He  has a past medical history of Atopic  "dermatitis, unspecified (07/25/2019), Chronic midline thoracic back pain (07/17/2017), History of vertebral compression fracture (08/11/2021), Hyperlipidemia LDL goal <100 (12/15/2020), Insomnia (04/18/2019), Migraine without aura (08/11/2021), and Seizure (HCC) (04/23/2019).    Lipid Panel:  Lab Results   Component Value Date/Time    CHOLSTRLTOT 258 (H) 04/16/2025 0738    TRIGLYCERIDE 110 04/16/2025 0738     (H) 04/16/2025 0738       ROS   Review of Systems   Constitutional: Negative.  Negative for fever, chills, weight loss, malaise/fatigue and diaphoresis.   HENT: Negative.  Negative for hearing loss, ear pain, nosebleeds, congestion, sore throat, neck pain, tinnitus and ear discharge.    Respiratory: Negative.  Negative for cough, hemoptysis, sputum production, shortness of breath, wheezing and stridor.    Cardiovascular: Negative.  Negative for chest pain, palpitations, orthopnea, claudication, leg swelling and PND.   Gastrointestinal: denies nausea, vomiting, diarrhea, constipation, heartburn, melena or hematochezia.  Genitourinary: Denies dysuria, hematuria, urinary incontinence, frequency or urgency.    Musculoskeletal: Negative.  Negative for myalgias and back pain.   Neurological: Negative.  Negative for dizziness, tingling, tremors, weakness and headaches.   Psych:  Denies depression, anxiety or insomnia.  All other systems reviewed and are negative.       Objective:     /76   Pulse 92   Temp 36.1 °C (97 °F) (Temporal)   Ht 1.803 m (5' 11\")   Wt 87 kg (191 lb 12.8 oz)   SpO2 96%  Body mass index is 26.75 kg/m².   Physical Exam  Respiratory: Clear to auscultation, no wheezing, rales or rhonchi  Cardiovascular: Regular rate and rhythm, no murmurs, rubs, or gallops  Physical Exam   Vitals reviewed.  Constitutional: oriented to person, place, and time. appears well-developed and well-nourished. No distress.   Neck: No JVD present.  Cardiovascular: Normal rate, regular rhythm, normal heart " sounds and intact distal pulses.  Exam reveals no gallop and no friction rub.  No murmur heard.  No carotid bruits.   Pulmonary/Chest: Effort normal and breath sounds normal. No stridor. No respiratory distress. no wheezes or rales. exhibits no tenderness.   Musculoskeletal: Normal range of motion. exhibits no edema. cara pedal pulses 2+.  Lymphadenopathy: no cervical or supraclavicular adenopathy.   Neurological: alert and oriented to person, place, and time. exhibits normal muscle tone. Coordination normal.   Skin: Skin is warm and dry. no diaphoresis.   Psychiatric: normal mood and affect. behavior is normal.       Assessment and Plan:   The following treatment plan was discussed    Assessment & Plan  1. Hyperlipidemia.  Significant lifestyle changes have been made, including increased cardio exercise and dietary modifications to include more vegetables and lean proteins while reducing red meat intake. A lipofit panel will be ordered instead of a regular cholesterol panel to assess the size of the particles. Tests for LP (a) and apolipoprotein B will also be conducted. The goal is to maintain LDL cholesterol level below 100. If results indicate an increase in small dense particles or elevated levels of LP (a) or apolipoprotein B, strict cholesterol control will be recommended.    2. Elevated coronary calcium score.  Coronary calcium score is 50, which is concerning given the patient's young age and family history of early-onset heart disease. Advised to continue current lifestyle modifications to manage this condition. Blood pressure is currently 132/76, which is within acceptable limits. Lipofit panel, LP (a), and apolipoprotein B tests will be ordered to further evaluate cardiovascular risk.    3. Testosterone level management.  Testosterone levels are within the normal range, but sex-binding globulin is slightly elevated by 3 points, which is not considered significant. Free testosterone levels are slightly  below the normal range (1.4, with 1.6 being low normal). No immediate action required, but these levels will be monitored.    4. Hepatitis B non-immunity.  Not immune to hepatitis B. Hepatitis B vaccine series was discussed, consisting of three shots (one today, one in a month, and one 6 months from today). Patient has opted to defer the hepatitis B vaccine series at this time.    5. Dermatology referral.  Referral to [facility] has been provided for further evaluation. Patient advised to follow up with the dermatology office.    Follow-up: The patient will follow up in November.      ORDERS:  1. Agatston coronary artery calcium score less than 100 (Primary)    - LipoFit by NMR; Future  - LIPO-ASSOC PHOS A2 (LP-PLA2); Future  - APOLIPOPROTEIN B; Future    2. Hyperlipidemia LDL goal <100    - LipoFit by NMR; Future  - LIPO-ASSOC PHOS A2 (LP-PLA2); Future  - APOLIPOPROTEIN B; Future        Please note that this dictation was created using voice recognition software. I have made every reasonable attempt to correct obvious errors, but I expect that there are errors of grammar and possibly content that I did not discover before finalizing the note.      Attestation      Verbal consent was acquired by the patient to use [employer] ambient listening note generation during this visit Yes                  [1]   Current Outpatient Medications   Medication Sig Dispense Refill    levETIRAcetam (KEPPRA) 500 MG Tab Take 1 Tablet by mouth every day. 90 Tablet 3    sertraline (ZOLOFT) 50 MG Tab Take 1 Tablet by mouth every day. 100 Tablet 0     No current facility-administered medications for this visit.   [2]   Current Outpatient Medications   Medication Sig Dispense Refill    levETIRAcetam (KEPPRA) 500 MG Tab Take 1 Tablet by mouth every day. 90 Tablet 3    sertraline (ZOLOFT) 50 MG Tab Take 1 Tablet by mouth every day. 100 Tablet 0     No current facility-administered medications for this visit.

## 2025-05-27 ENCOUNTER — RESULTS FOLLOW-UP (OUTPATIENT)
Dept: MEDICAL GROUP | Facility: MEDICAL CENTER | Age: 35
End: 2025-05-27

## 2025-06-23 ENCOUNTER — APPOINTMENT (OUTPATIENT)
Dept: RADIOLOGY | Facility: IMAGING CENTER | Age: 35
End: 2025-06-23
Attending: PHYSICIAN ASSISTANT
Payer: COMMERCIAL

## 2025-06-23 ENCOUNTER — OFFICE VISIT (OUTPATIENT)
Dept: URGENT CARE | Facility: CLINIC | Age: 35
End: 2025-06-23
Payer: COMMERCIAL

## 2025-06-23 VITALS
WEIGHT: 196 LBS | OXYGEN SATURATION: 98 % | HEIGHT: 71 IN | DIASTOLIC BLOOD PRESSURE: 82 MMHG | BODY MASS INDEX: 27.44 KG/M2 | SYSTOLIC BLOOD PRESSURE: 116 MMHG | TEMPERATURE: 98.6 F | HEART RATE: 71 BPM | RESPIRATION RATE: 18 BRPM

## 2025-06-23 DIAGNOSIS — S60.419A ABRASION OF FINGER, INITIAL ENCOUNTER: ICD-10-CM

## 2025-06-23 DIAGNOSIS — S60.041A CONTUSION OF RIGHT RING FINGER WITHOUT DAMAGE TO NAIL, INITIAL ENCOUNTER: Primary | ICD-10-CM

## 2025-06-23 DIAGNOSIS — M79.644 FINGER PAIN, RIGHT: ICD-10-CM

## 2025-06-23 PROCEDURE — 3074F SYST BP LT 130 MM HG: CPT | Performed by: PHYSICIAN ASSISTANT

## 2025-06-23 PROCEDURE — 3079F DIAST BP 80-89 MM HG: CPT | Performed by: PHYSICIAN ASSISTANT

## 2025-06-23 PROCEDURE — 99213 OFFICE O/P EST LOW 20 MIN: CPT | Performed by: PHYSICIAN ASSISTANT

## 2025-06-23 PROCEDURE — 73140 X-RAY EXAM OF FINGER(S): CPT | Mod: TC,RT | Performed by: RADIOLOGY

## 2025-06-23 RX ORDER — CEPHALEXIN 500 MG/1
500 CAPSULE ORAL 4 TIMES DAILY
Qty: 28 CAPSULE | Refills: 0 | Status: SHIPPED | OUTPATIENT
Start: 2025-06-23 | End: 2025-06-30

## 2025-06-23 ASSESSMENT — ENCOUNTER SYMPTOMS
TINGLING: 0
CHILLS: 0
ARTHRALGIAS: 1
FEVER: 0
JOINT SWELLING: 1
SENSORY CHANGE: 0
FOCAL WEAKNESS: 0
VOMITING: 0
NUMBNESS: 0
NAUSEA: 0
WEAKNESS: 0

## 2025-06-23 ASSESSMENT — FIBROSIS 4 INDEX: FIB4 SCORE: 0.57

## 2025-06-23 NOTE — PROGRESS NOTES
"Subjective     Tavares Knowles is a 34 y.o. male who presents with Hand Injury ( hit his hand on friday)            Hand Injury  This is a new problem. Episode onset: 4 days ago. A  fell onto his right small finger causes an abrasion and swelling- no improvement and green changes to abrasion/ The problem occurs constantly. The problem has been gradually worsening. Associated symptoms include arthralgias and joint swelling. Pertinent negatives include no chills, fever, nausea, numbness, vomiting or weakness. The symptoms are aggravated by bending. Treatments tried: basic wound care.   TDAP is UTD.       Past Medical History[1]      Past Surgical History[2]    Family History   Problem Relation Age of Onset    Heart Disease Mother 58        pacemaker    Hypertension Father     Obesity Father     Hyperlipidemia Father     Hyperlipidemia Brother     Alzheimer's Disease Maternal Grandfather     Stroke Paternal Grandmother        Patient has no known allergies.    Medications, Allergies, and current problem list reviewed today in Epic      Review of Systems   Constitutional:  Negative for chills, fever and malaise/fatigue.   Gastrointestinal:  Negative for nausea and vomiting.   Musculoskeletal:  Positive for arthralgias, joint pain (right 5th finger pain, swelling, abrasion, redness) and joint swelling.   Neurological:  Negative for tingling, sensory change, focal weakness, weakness and numbness.              Objective     /82   Pulse 71   Temp 37 °C (98.6 °F) (Temporal)   Resp 18   Ht 1.803 m (5' 11\")   Wt 88.9 kg (196 lb)   SpO2 98%   BMI 27.34 kg/m²      Physical Exam  Constitutional:       General: He is not in acute distress.     Appearance: He is not ill-appearing.   HENT:      Head: Normocephalic and atraumatic.   Eyes:      Conjunctiva/sclera: Conjunctivae normal.   Cardiovascular:      Rate and Rhythm: Normal rate and regular rhythm.   Pulmonary:      Effort: Pulmonary " effort is normal. No respiratory distress.      Breath sounds: No stridor. No wheezing.   Musculoskeletal:        Hands:    Skin:     General: Skin is warm and dry.   Neurological:      General: No focal deficit present.      Mental Status: He is alert and oriented to person, place, and time.   Psychiatric:         Mood and Affect: Mood normal.         Behavior: Behavior normal.         Thought Content: Thought content normal.         Judgment: Judgment normal.                  6/23/2025 1:09 PM     HISTORY/REASON FOR EXAM:  Pain/Deformity Following Trauma.     TECHNIQUE/EXAM DESCRIPTION AND NUMBER OF VIEWS:  3 views of the RIGHT fingers.     COMPARISON: None     FINDINGS:  Bones: Normal bone mineralization. No fracture. No focal bone lesion.     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 No subluxation. Remaining   joint spaces are preserved.     Soft tissues: Proximal right fifth finger soft tissue swelling.     IMPRESSION:     1.  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 No acute fracture.  2.  Proximal right fifth finger soft tissue swelling.                Assessment & Plan  Contusion of right ring finger without damage to nail, initial encounter    Orders:    DX-FINGER(S) 2+ RIGHT; Future    Abrasion of finger, initial encounter    Orders:    cephALEXin (KEFLEX) 500 MG Cap; Take 1 Capsule by mouth 4 times a day for 7 days.       Differential diagnoses, Supportive care, and indications for immediate follow-up discussed with patient.   Pathogenesis of diagnosis discussed including typical length and natural progression.   Instructed to return to clinic or nearest  emergency department for any change in condition, further concerns, or worsening of symptoms.    The patient demonstrated a good understanding and agreed with the treatment plan.    Dennise Estevez P.A.-C.                 [1]   Past Medical History:  Diagnosis Date    Atopic dermatitis, unspecified 07/25/2019    Chronic midline thoracic back pain 07/17/2017    History of vertebral compression fracture 08/11/2021    Hyperlipidemia LDL goal <100 12/15/2020    Insomnia 04/18/2019    Migraine without aura 08/11/2021    Seizure (HCC) 04/23/2019   [2] No past surgical history on file.

## 2025-07-13 DIAGNOSIS — F33.1 MODERATE EPISODE OF RECURRENT MAJOR DEPRESSIVE DISORDER (HCC): ICD-10-CM

## 2025-08-20 ENCOUNTER — NON-PROVIDER VISIT (OUTPATIENT)
Dept: NEUROLOGY | Facility: MEDICAL CENTER | Age: 35
End: 2025-08-20
Attending: NURSE PRACTITIONER
Payer: COMMERCIAL

## 2025-08-20 DIAGNOSIS — R20.0 BILATERAL ARM NUMBNESS AND TINGLING WHILE SLEEPING: ICD-10-CM

## 2025-08-20 DIAGNOSIS — R20.2 BILATERAL ARM NUMBNESS AND TINGLING WHILE SLEEPING: ICD-10-CM

## 2025-08-20 DIAGNOSIS — R20.0 NUMBNESS: Primary | ICD-10-CM

## 2025-08-20 PROCEDURE — 95913 NRV CNDJ TEST 13/> STUDIES: CPT | Mod: 26 | Performed by: STUDENT IN AN ORGANIZED HEALTH CARE EDUCATION/TRAINING PROGRAM

## 2025-08-20 PROCEDURE — 95886 MUSC TEST DONE W/N TEST COMP: CPT | Mod: 26 | Performed by: STUDENT IN AN ORGANIZED HEALTH CARE EDUCATION/TRAINING PROGRAM

## 2025-08-20 PROCEDURE — 95886 MUSC TEST DONE W/N TEST COMP: CPT | Performed by: STUDENT IN AN ORGANIZED HEALTH CARE EDUCATION/TRAINING PROGRAM

## 2025-08-20 PROCEDURE — 95913 NRV CNDJ TEST 13/> STUDIES: CPT | Performed by: STUDENT IN AN ORGANIZED HEALTH CARE EDUCATION/TRAINING PROGRAM
